# Patient Record
Sex: FEMALE | Race: ASIAN | NOT HISPANIC OR LATINO | URBAN - METROPOLITAN AREA
[De-identification: names, ages, dates, MRNs, and addresses within clinical notes are randomized per-mention and may not be internally consistent; named-entity substitution may affect disease eponyms.]

---

## 2017-01-27 ENCOUNTER — OUTPATIENT (OUTPATIENT)
Dept: OUTPATIENT SERVICES | Facility: HOSPITAL | Age: 62
LOS: 1 days | End: 2017-01-27
Payer: COMMERCIAL

## 2017-01-27 ENCOUNTER — APPOINTMENT (OUTPATIENT)
Dept: ULTRASOUND IMAGING | Facility: IMAGING CENTER | Age: 62
End: 2017-01-27

## 2017-01-27 ENCOUNTER — APPOINTMENT (OUTPATIENT)
Dept: MAMMOGRAPHY | Facility: IMAGING CENTER | Age: 62
End: 2017-01-27

## 2017-01-27 DIAGNOSIS — Z00.8 ENCOUNTER FOR OTHER GENERAL EXAMINATION: ICD-10-CM

## 2017-01-27 DIAGNOSIS — Z98.89 OTHER SPECIFIED POSTPROCEDURAL STATES: Chronic | ICD-10-CM

## 2017-01-27 PROCEDURE — G0279: CPT

## 2017-01-27 PROCEDURE — 77066 DX MAMMO INCL CAD BI: CPT

## 2017-01-27 PROCEDURE — 76642 ULTRASOUND BREAST LIMITED: CPT

## 2017-08-31 ENCOUNTER — RX RENEWAL (OUTPATIENT)
Age: 62
End: 2017-08-31

## 2017-08-31 ENCOUNTER — APPOINTMENT (OUTPATIENT)
Dept: ORTHOPEDIC SURGERY | Facility: CLINIC | Age: 62
End: 2017-08-31
Payer: COMMERCIAL

## 2017-08-31 VITALS — WEIGHT: 199 LBS | HEIGHT: 62 IN | BODY MASS INDEX: 36.62 KG/M2

## 2017-08-31 VITALS — HEART RATE: 72 BPM | DIASTOLIC BLOOD PRESSURE: 77 MMHG | SYSTOLIC BLOOD PRESSURE: 144 MMHG

## 2017-08-31 PROCEDURE — 73562 X-RAY EXAM OF KNEE 3: CPT | Mod: LT

## 2017-08-31 PROCEDURE — 99214 OFFICE O/P EST MOD 30 MIN: CPT

## 2017-09-07 ENCOUNTER — OTHER (OUTPATIENT)
Age: 62
End: 2017-09-07

## 2017-09-13 ENCOUNTER — OTHER (OUTPATIENT)
Age: 62
End: 2017-09-13

## 2017-09-20 ENCOUNTER — OTHER (OUTPATIENT)
Age: 62
End: 2017-09-20

## 2017-09-27 ENCOUNTER — OTHER (OUTPATIENT)
Age: 62
End: 2017-09-27

## 2018-02-01 ENCOUNTER — APPOINTMENT (OUTPATIENT)
Dept: ORTHOPEDIC SURGERY | Facility: CLINIC | Age: 63
End: 2018-02-01
Payer: COMMERCIAL

## 2018-02-01 VITALS
DIASTOLIC BLOOD PRESSURE: 81 MMHG | HEIGHT: 62 IN | BODY MASS INDEX: 35.7 KG/M2 | HEART RATE: 72 BPM | WEIGHT: 194 LBS | SYSTOLIC BLOOD PRESSURE: 151 MMHG

## 2018-02-01 PROCEDURE — 73562 X-RAY EXAM OF KNEE 3: CPT | Mod: RT

## 2018-02-01 PROCEDURE — 99214 OFFICE O/P EST MOD 30 MIN: CPT

## 2018-02-08 ENCOUNTER — OTHER (OUTPATIENT)
Age: 63
End: 2018-02-08

## 2018-02-15 ENCOUNTER — APPOINTMENT (OUTPATIENT)
Dept: ULTRASOUND IMAGING | Facility: IMAGING CENTER | Age: 63
End: 2018-02-15
Payer: COMMERCIAL

## 2018-02-15 ENCOUNTER — APPOINTMENT (OUTPATIENT)
Dept: MAMMOGRAPHY | Facility: IMAGING CENTER | Age: 63
End: 2018-02-15
Payer: COMMERCIAL

## 2018-02-15 ENCOUNTER — OUTPATIENT (OUTPATIENT)
Dept: OUTPATIENT SERVICES | Facility: HOSPITAL | Age: 63
LOS: 1 days | End: 2018-02-15
Payer: COMMERCIAL

## 2018-02-15 DIAGNOSIS — Z98.89 OTHER SPECIFIED POSTPROCEDURAL STATES: Chronic | ICD-10-CM

## 2018-02-15 DIAGNOSIS — Z00.8 ENCOUNTER FOR OTHER GENERAL EXAMINATION: ICD-10-CM

## 2018-02-15 PROCEDURE — 77063 BREAST TOMOSYNTHESIS BI: CPT | Mod: 26

## 2018-02-15 PROCEDURE — 76641 ULTRASOUND BREAST COMPLETE: CPT | Mod: 26,50

## 2018-02-15 PROCEDURE — 77067 SCR MAMMO BI INCL CAD: CPT | Mod: 26

## 2018-02-15 PROCEDURE — 76641 ULTRASOUND BREAST COMPLETE: CPT

## 2018-02-15 PROCEDURE — 77063 BREAST TOMOSYNTHESIS BI: CPT

## 2018-02-15 PROCEDURE — 77067 SCR MAMMO BI INCL CAD: CPT

## 2018-03-29 ENCOUNTER — APPOINTMENT (OUTPATIENT)
Dept: ORTHOPEDIC SURGERY | Facility: CLINIC | Age: 63
End: 2018-03-29
Payer: COMMERCIAL

## 2018-03-29 VITALS — DIASTOLIC BLOOD PRESSURE: 82 MMHG | SYSTOLIC BLOOD PRESSURE: 138 MMHG | HEART RATE: 69 BPM

## 2018-03-29 DIAGNOSIS — Z96.652 PRESENCE OF LEFT ARTIFICIAL KNEE JOINT: ICD-10-CM

## 2018-03-29 DIAGNOSIS — Z78.9 OTHER SPECIFIED HEALTH STATUS: ICD-10-CM

## 2018-03-29 PROCEDURE — 73562 X-RAY EXAM OF KNEE 3: CPT | Mod: LT

## 2018-03-29 PROCEDURE — 99214 OFFICE O/P EST MOD 30 MIN: CPT

## 2018-04-03 ENCOUNTER — OTHER (OUTPATIENT)
Age: 63
End: 2018-04-03

## 2018-07-24 ENCOUNTER — OUTPATIENT (OUTPATIENT)
Dept: OUTPATIENT SERVICES | Facility: HOSPITAL | Age: 63
LOS: 1 days | End: 2018-07-24
Payer: COMMERCIAL

## 2018-07-24 ENCOUNTER — APPOINTMENT (OUTPATIENT)
Dept: CT IMAGING | Facility: CLINIC | Age: 63
End: 2018-07-24
Payer: COMMERCIAL

## 2018-07-24 DIAGNOSIS — M17.11 UNILATERAL PRIMARY OSTEOARTHRITIS, RIGHT KNEE: ICD-10-CM

## 2018-07-24 DIAGNOSIS — Z98.89 OTHER SPECIFIED POSTPROCEDURAL STATES: Chronic | ICD-10-CM

## 2018-07-24 PROCEDURE — 73700 CT LOWER EXTREMITY W/O DYE: CPT | Mod: 26,RT

## 2018-07-24 PROCEDURE — 73700 CT LOWER EXTREMITY W/O DYE: CPT

## 2018-09-11 ENCOUNTER — OUTPATIENT (OUTPATIENT)
Dept: OUTPATIENT SERVICES | Facility: HOSPITAL | Age: 63
LOS: 1 days | End: 2018-09-11
Payer: COMMERCIAL

## 2018-09-11 VITALS
TEMPERATURE: 98 F | SYSTOLIC BLOOD PRESSURE: 130 MMHG | HEIGHT: 62 IN | HEART RATE: 60 BPM | WEIGHT: 195.99 LBS | DIASTOLIC BLOOD PRESSURE: 70 MMHG

## 2018-09-11 DIAGNOSIS — M17.11 UNILATERAL PRIMARY OSTEOARTHRITIS, RIGHT KNEE: ICD-10-CM

## 2018-09-11 DIAGNOSIS — I10 ESSENTIAL (PRIMARY) HYPERTENSION: ICD-10-CM

## 2018-09-11 DIAGNOSIS — T78.40XA ALLERGY, UNSPECIFIED, INITIAL ENCOUNTER: ICD-10-CM

## 2018-09-11 DIAGNOSIS — Z96.652 PRESENCE OF LEFT ARTIFICIAL KNEE JOINT: Chronic | ICD-10-CM

## 2018-09-11 DIAGNOSIS — K21.9 GASTRO-ESOPHAGEAL REFLUX DISEASE WITHOUT ESOPHAGITIS: ICD-10-CM

## 2018-09-11 DIAGNOSIS — M17.10 UNILATERAL PRIMARY OSTEOARTHRITIS, UNSPECIFIED KNEE: ICD-10-CM

## 2018-09-11 DIAGNOSIS — Z98.89 OTHER SPECIFIED POSTPROCEDURAL STATES: Chronic | ICD-10-CM

## 2018-09-11 LAB
APPEARANCE UR: CLEAR — SIGNIFICANT CHANGE UP
BACTERIA # UR AUTO: NEGATIVE — SIGNIFICANT CHANGE UP
BILIRUB UR-MCNC: NEGATIVE — SIGNIFICANT CHANGE UP
BLD GP AB SCN SERPL QL: NEGATIVE — SIGNIFICANT CHANGE UP
BLOOD UR QL VISUAL: NEGATIVE — SIGNIFICANT CHANGE UP
BUN SERPL-MCNC: 17 MG/DL — SIGNIFICANT CHANGE UP (ref 7–23)
CALCIUM SERPL-MCNC: 9.2 MG/DL — SIGNIFICANT CHANGE UP (ref 8.4–10.5)
CHLORIDE SERPL-SCNC: 105 MMOL/L — SIGNIFICANT CHANGE UP (ref 98–107)
CO2 SERPL-SCNC: 25 MMOL/L — SIGNIFICANT CHANGE UP (ref 22–31)
COLOR SPEC: YELLOW — SIGNIFICANT CHANGE UP
CREAT SERPL-MCNC: 0.66 MG/DL — SIGNIFICANT CHANGE UP (ref 0.5–1.3)
GLUCOSE SERPL-MCNC: 101 MG/DL — HIGH (ref 70–99)
GLUCOSE UR-MCNC: NEGATIVE — SIGNIFICANT CHANGE UP
HBA1C BLD-MCNC: 5.7 % — HIGH (ref 4–5.6)
HCT VFR BLD CALC: 40.3 % — SIGNIFICANT CHANGE UP (ref 34.5–45)
HGB BLD-MCNC: 13.4 G/DL — SIGNIFICANT CHANGE UP (ref 11.5–15.5)
HYALINE CASTS # UR AUTO: NEGATIVE — SIGNIFICANT CHANGE UP
KETONES UR-MCNC: NEGATIVE — SIGNIFICANT CHANGE UP
LEUKOCYTE ESTERASE UR-ACNC: NEGATIVE — SIGNIFICANT CHANGE UP
MCHC RBC-ENTMCNC: 30.2 PG — SIGNIFICANT CHANGE UP (ref 27–34)
MCHC RBC-ENTMCNC: 33.3 % — SIGNIFICANT CHANGE UP (ref 32–36)
MCV RBC AUTO: 90.8 FL — SIGNIFICANT CHANGE UP (ref 80–100)
NITRITE UR-MCNC: NEGATIVE — SIGNIFICANT CHANGE UP
NRBC # FLD: 0 — SIGNIFICANT CHANGE UP
PH UR: 5.5 — SIGNIFICANT CHANGE UP (ref 5–8)
PLATELET # BLD AUTO: 187 K/UL — SIGNIFICANT CHANGE UP (ref 150–400)
PMV BLD: 13.8 FL — HIGH (ref 7–13)
POTASSIUM SERPL-MCNC: 3.8 MMOL/L — SIGNIFICANT CHANGE UP (ref 3.5–5.3)
POTASSIUM SERPL-SCNC: 3.8 MMOL/L — SIGNIFICANT CHANGE UP (ref 3.5–5.3)
PROT UR-MCNC: 20 — SIGNIFICANT CHANGE UP
RBC # BLD: 4.44 M/UL — SIGNIFICANT CHANGE UP (ref 3.8–5.2)
RBC # FLD: 13.3 % — SIGNIFICANT CHANGE UP (ref 10.3–14.5)
RBC CASTS # UR COMP ASSIST: HIGH (ref 0–?)
RH IG SCN BLD-IMP: POSITIVE — SIGNIFICANT CHANGE UP
SODIUM SERPL-SCNC: 142 MMOL/L — SIGNIFICANT CHANGE UP (ref 135–145)
SP GR SPEC: 1.03 — SIGNIFICANT CHANGE UP (ref 1–1.04)
SQUAMOUS # UR AUTO: SIGNIFICANT CHANGE UP
UROBILINOGEN FLD QL: NORMAL — SIGNIFICANT CHANGE UP
WBC # BLD: 6.41 K/UL — SIGNIFICANT CHANGE UP (ref 3.8–10.5)
WBC # FLD AUTO: 6.41 K/UL — SIGNIFICANT CHANGE UP (ref 3.8–10.5)
WBC UR QL: SIGNIFICANT CHANGE UP (ref 0–?)

## 2018-09-11 PROCEDURE — 93010 ELECTROCARDIOGRAM REPORT: CPT

## 2018-09-11 RX ORDER — ACETAMINOPHEN 500 MG
2 TABLET ORAL
Qty: 0 | Refills: 0 | COMMUNITY

## 2018-09-11 NOTE — H&P PST ADULT - NSANTHOSAYNRD_GEN_A_CORE
No. FOREIGN screening performed.  STOP BANG Legend: 0-2 = LOW Risk; 3-4 = INTERMEDIATE Risk; 5-8 = HIGH Risk

## 2018-09-11 NOTE — H&P PST ADULT - PROBLEM SELECTOR PLAN 1
Right Total Knee Replacement     Pre op instructions reviewed with pt ; pt appears to have a good understanding of pre op instructions    Pt to Dr Urban for pre op m/c ; ekg faxed for comparison s/w Holli

## 2018-09-11 NOTE — H&P PST ADULT - PSH
H/O dilation and curettage  2 years ago  H/O total knee replacement, left  2015   (normal spontaneous vaginal delivery)  x3

## 2018-09-11 NOTE — H&P PST ADULT - FAMILY HISTORY
Mother  Still living? Yes, Estimated age: 71-80  Family history of hypertension, Age at diagnosis: Age Unknown

## 2018-09-11 NOTE — H&P PST ADULT - PROBLEM SELECTOR PLAN 2
ASA, Naproxen, tetanus Toxoid, " glue used for Left THR"     OR booking notified via fax ASA, Naproxen, tetanus Toxoid, " glue used for Left TKR"   regarding "glue allergy " pt to review with surgeon pre op    OR booking notified via fax

## 2018-09-11 NOTE — H&P PST ADULT - PMH
Abdominal pain  9/11/18 ; pt denies  GERD (gastroesophageal reflux disease)    HTN (hypertension)    Hypothyroidism  9/11/18 ; pt denies  Obesity (BMI 30-39.9)    Osteoarthrosis, localized, primary, involving lower leg

## 2018-09-11 NOTE — H&P PST ADULT - HISTORY OF PRESENT ILLNESS
Pt is a 63 y.o. female ; pt s/p Left TKR ; 6/15. Pt reports"  ALLERGIC REACTION TO GLUE " Pt c/o right knee pain; difficulty with mobility . Pt to surgeon ; xray and a c/t scan were done ; pt now presents for Right [place] Knee Replacement Pt is a 63 y.o. female ; pt s/p Left TKR ; 6/15. Pt reports"  ALLERGIC REACTION TO GLUE " Pt c/o right knee pain; difficulty with mobility . Pt to surgeon ; xray and a c/t scan were done ; pt now presents for Right Total  Knee Replacement

## 2018-09-12 LAB — SPECIMEN SOURCE: SIGNIFICANT CHANGE UP

## 2018-09-13 LAB
BACTERIA NPH CULT: SIGNIFICANT CHANGE UP
BACTERIA UR CULT: SIGNIFICANT CHANGE UP
SPECIMEN SOURCE: SIGNIFICANT CHANGE UP

## 2018-09-14 ENCOUNTER — OTHER (OUTPATIENT)
Age: 63
End: 2018-09-14

## 2018-09-19 ENCOUNTER — OTHER (OUTPATIENT)
Age: 63
End: 2018-09-19

## 2018-09-19 DIAGNOSIS — M17.11 UNILATERAL PRIMARY OSTEOARTHRITIS, RIGHT KNEE: ICD-10-CM

## 2018-09-21 PROBLEM — K21.9 GASTRO-ESOPHAGEAL REFLUX DISEASE WITHOUT ESOPHAGITIS: Chronic | Status: ACTIVE | Noted: 2018-09-11

## 2018-09-24 ENCOUNTER — TRANSCRIPTION ENCOUNTER (OUTPATIENT)
Age: 63
End: 2018-09-24

## 2018-09-24 NOTE — ASU PATIENT PROFILE, ADULT - CAREGIVER ADDRESS
[address] [place] [address] [facility] [id number] [address] [facility] [id number]

## 2018-09-25 ENCOUNTER — APPOINTMENT (OUTPATIENT)
Dept: ORTHOPEDIC SURGERY | Facility: HOSPITAL | Age: 63
End: 2018-09-25

## 2018-09-25 ENCOUNTER — RESULT REVIEW (OUTPATIENT)
Age: 63
End: 2018-09-25

## 2018-09-25 ENCOUNTER — INPATIENT (INPATIENT)
Facility: HOSPITAL | Age: 63
LOS: 1 days | Discharge: HOME HEALTH | End: 2018-09-27
Attending: ORTHOPAEDIC SURGERY | Admitting: ORTHOPAEDIC SURGERY
Payer: COMMERCIAL

## 2018-09-25 VITALS
RESPIRATION RATE: 17 BRPM | WEIGHT: 195.99 LBS | HEIGHT: 62 IN | TEMPERATURE: 98 F | DIASTOLIC BLOOD PRESSURE: 79 MMHG | SYSTOLIC BLOOD PRESSURE: 145 MMHG | OXYGEN SATURATION: 98 % | HEART RATE: 69 BPM

## 2018-09-25 DIAGNOSIS — M17.11 UNILATERAL PRIMARY OSTEOARTHRITIS, RIGHT KNEE: ICD-10-CM

## 2018-09-25 DIAGNOSIS — Z98.89 OTHER SPECIFIED POSTPROCEDURAL STATES: Chronic | ICD-10-CM

## 2018-09-25 DIAGNOSIS — Z96.652 PRESENCE OF LEFT ARTIFICIAL KNEE JOINT: Chronic | ICD-10-CM

## 2018-09-25 LAB
BUN SERPL-MCNC: 17 MG/DL — SIGNIFICANT CHANGE UP (ref 7–23)
CALCIUM SERPL-MCNC: 9.2 MG/DL — SIGNIFICANT CHANGE UP (ref 8.4–10.5)
CHLORIDE SERPL-SCNC: 98 MMOL/L — SIGNIFICANT CHANGE UP (ref 98–107)
CO2 SERPL-SCNC: 25 MMOL/L — SIGNIFICANT CHANGE UP (ref 22–31)
CREAT SERPL-MCNC: 0.67 MG/DL — SIGNIFICANT CHANGE UP (ref 0.5–1.3)
GLUCOSE SERPL-MCNC: 149 MG/DL — HIGH (ref 70–99)
HCT VFR BLD CALC: 42 % — SIGNIFICANT CHANGE UP (ref 34.5–45)
HGB BLD-MCNC: 14 G/DL — SIGNIFICANT CHANGE UP (ref 11.5–15.5)
MCHC RBC-ENTMCNC: 29.7 PG — SIGNIFICANT CHANGE UP (ref 27–34)
MCHC RBC-ENTMCNC: 33.3 % — SIGNIFICANT CHANGE UP (ref 32–36)
MCV RBC AUTO: 89 FL — SIGNIFICANT CHANGE UP (ref 80–100)
NRBC # FLD: 0 — SIGNIFICANT CHANGE UP
PLATELET # BLD AUTO: 165 K/UL — SIGNIFICANT CHANGE UP (ref 150–400)
PMV BLD: 13.8 FL — HIGH (ref 7–13)
POTASSIUM SERPL-MCNC: 4.2 MMOL/L — SIGNIFICANT CHANGE UP (ref 3.5–5.3)
POTASSIUM SERPL-SCNC: 4.2 MMOL/L — SIGNIFICANT CHANGE UP (ref 3.5–5.3)
RBC # BLD: 4.72 M/UL — SIGNIFICANT CHANGE UP (ref 3.8–5.2)
RBC # FLD: 13 % — SIGNIFICANT CHANGE UP (ref 10.3–14.5)
SODIUM SERPL-SCNC: 138 MMOL/L — SIGNIFICANT CHANGE UP (ref 135–145)
WBC # BLD: 8.28 K/UL — SIGNIFICANT CHANGE UP (ref 3.8–10.5)
WBC # FLD AUTO: 8.28 K/UL — SIGNIFICANT CHANGE UP (ref 3.8–10.5)

## 2018-09-25 PROCEDURE — 73560 X-RAY EXAM OF KNEE 1 OR 2: CPT | Mod: 26,RT

## 2018-09-25 PROCEDURE — 88305 TISSUE EXAM BY PATHOLOGIST: CPT | Mod: 26

## 2018-09-25 PROCEDURE — 88311 DECALCIFY TISSUE: CPT | Mod: 26

## 2018-09-25 PROCEDURE — 27447 TOTAL KNEE ARTHROPLASTY: CPT | Mod: RT

## 2018-09-25 RX ORDER — GABAPENTIN 400 MG/1
100 CAPSULE ORAL EVERY 8 HOURS
Qty: 0 | Refills: 0 | Status: DISCONTINUED | OUTPATIENT
Start: 2018-09-25 | End: 2018-09-25

## 2018-09-25 RX ORDER — ACETAMINOPHEN 500 MG
650 TABLET ORAL EVERY 6 HOURS
Qty: 0 | Refills: 0 | Status: DISCONTINUED | OUTPATIENT
Start: 2018-09-25 | End: 2018-09-27

## 2018-09-25 RX ORDER — SODIUM CHLORIDE 9 MG/ML
1000 INJECTION INTRAMUSCULAR; INTRAVENOUS; SUBCUTANEOUS ONCE
Qty: 0 | Refills: 0 | Status: COMPLETED | OUTPATIENT
Start: 2018-09-26 | End: 2018-09-26

## 2018-09-25 RX ORDER — SENNA PLUS 8.6 MG/1
2 TABLET ORAL AT BEDTIME
Qty: 0 | Refills: 0 | Status: DISCONTINUED | OUTPATIENT
Start: 2018-09-25 | End: 2018-09-27

## 2018-09-25 RX ORDER — POLYETHYLENE GLYCOL 3350 17 G/17G
17 POWDER, FOR SOLUTION ORAL DAILY
Qty: 0 | Refills: 0 | Status: DISCONTINUED | OUTPATIENT
Start: 2018-09-25 | End: 2018-09-27

## 2018-09-25 RX ORDER — OXYCODONE HYDROCHLORIDE 5 MG/1
10 TABLET ORAL EVERY 4 HOURS
Qty: 0 | Refills: 0 | Status: DISCONTINUED | OUTPATIENT
Start: 2018-09-25 | End: 2018-09-27

## 2018-09-25 RX ORDER — DOCUSATE SODIUM 100 MG
100 CAPSULE ORAL THREE TIMES A DAY
Qty: 0 | Refills: 0 | Status: DISCONTINUED | OUTPATIENT
Start: 2018-09-25 | End: 2018-09-27

## 2018-09-25 RX ORDER — METOCLOPRAMIDE HCL 10 MG
10 TABLET ORAL ONCE
Qty: 0 | Refills: 0 | Status: DISCONTINUED | OUTPATIENT
Start: 2018-09-25 | End: 2018-09-25

## 2018-09-25 RX ORDER — PANTOPRAZOLE SODIUM 20 MG/1
40 TABLET, DELAYED RELEASE ORAL DAILY
Qty: 0 | Refills: 0 | Status: DISCONTINUED | OUTPATIENT
Start: 2018-09-25 | End: 2018-09-27

## 2018-09-25 RX ORDER — CELECOXIB 200 MG/1
200 CAPSULE ORAL DAILY
Qty: 0 | Refills: 0 | Status: DISCONTINUED | OUTPATIENT
Start: 2018-09-25 | End: 2018-09-27

## 2018-09-25 RX ORDER — HYDROMORPHONE HYDROCHLORIDE 2 MG/ML
0.5 INJECTION INTRAMUSCULAR; INTRAVENOUS; SUBCUTANEOUS
Qty: 0 | Refills: 0 | Status: DISCONTINUED | OUTPATIENT
Start: 2018-09-25 | End: 2018-09-25

## 2018-09-25 RX ORDER — HYDROMORPHONE HYDROCHLORIDE 2 MG/ML
0.25 INJECTION INTRAMUSCULAR; INTRAVENOUS; SUBCUTANEOUS
Qty: 0 | Refills: 0 | Status: DISCONTINUED | OUTPATIENT
Start: 2018-09-25 | End: 2018-09-25

## 2018-09-25 RX ORDER — MORPHINE SULFATE 50 MG/1
2 CAPSULE, EXTENDED RELEASE ORAL
Qty: 0 | Refills: 0 | Status: DISCONTINUED | OUTPATIENT
Start: 2018-09-25 | End: 2018-09-27

## 2018-09-25 RX ORDER — LOSARTAN POTASSIUM 100 MG/1
50 TABLET, FILM COATED ORAL DAILY
Qty: 0 | Refills: 0 | Status: DISCONTINUED | OUTPATIENT
Start: 2018-09-25 | End: 2018-09-27

## 2018-09-25 RX ORDER — SODIUM CHLORIDE 9 MG/ML
1000 INJECTION, SOLUTION INTRAVENOUS
Qty: 0 | Refills: 0 | Status: DISCONTINUED | OUTPATIENT
Start: 2018-09-25 | End: 2018-09-26

## 2018-09-25 RX ORDER — PANTOPRAZOLE SODIUM 20 MG/1
40 TABLET, DELAYED RELEASE ORAL ONCE
Qty: 0 | Refills: 0 | Status: DISCONTINUED | OUTPATIENT
Start: 2018-09-25 | End: 2018-09-25

## 2018-09-25 RX ORDER — MORPHINE SULFATE 50 MG/1
4 CAPSULE, EXTENDED RELEASE ORAL ONCE
Qty: 0 | Refills: 0 | Status: DISCONTINUED | OUTPATIENT
Start: 2018-09-25 | End: 2018-09-27

## 2018-09-25 RX ORDER — SODIUM CHLORIDE 9 MG/ML
1000 INJECTION INTRAMUSCULAR; INTRAVENOUS; SUBCUTANEOUS ONCE
Qty: 0 | Refills: 0 | Status: COMPLETED | OUTPATIENT
Start: 2018-09-25 | End: 2018-09-25

## 2018-09-25 RX ORDER — ONDANSETRON 8 MG/1
4 TABLET, FILM COATED ORAL EVERY 6 HOURS
Qty: 0 | Refills: 0 | Status: DISCONTINUED | OUTPATIENT
Start: 2018-09-25 | End: 2018-09-27

## 2018-09-25 RX ORDER — OXYCODONE HYDROCHLORIDE 5 MG/1
5 TABLET ORAL EVERY 4 HOURS
Qty: 0 | Refills: 0 | Status: DISCONTINUED | OUTPATIENT
Start: 2018-09-25 | End: 2018-09-27

## 2018-09-25 RX ORDER — CEFAZOLIN SODIUM 1 G
2000 VIAL (EA) INJECTION EVERY 8 HOURS
Qty: 0 | Refills: 0 | Status: COMPLETED | OUTPATIENT
Start: 2018-09-25 | End: 2018-09-26

## 2018-09-25 RX ORDER — TRAMADOL HYDROCHLORIDE 50 MG/1
50 TABLET ORAL EVERY 8 HOURS
Qty: 0 | Refills: 0 | Status: DISCONTINUED | OUTPATIENT
Start: 2018-09-25 | End: 2018-09-27

## 2018-09-25 RX ORDER — INFLUENZA VIRUS VACCINE 15; 15; 15; 15 UG/.5ML; UG/.5ML; UG/.5ML; UG/.5ML
0.5 SUSPENSION INTRAMUSCULAR ONCE
Qty: 0 | Refills: 0 | Status: COMPLETED | OUTPATIENT
Start: 2018-09-25 | End: 2018-09-27

## 2018-09-25 RX ORDER — ACETAMINOPHEN 500 MG
975 TABLET ORAL ONCE
Qty: 0 | Refills: 0 | Status: COMPLETED | OUTPATIENT
Start: 2018-09-25 | End: 2018-09-25

## 2018-09-25 RX ORDER — SODIUM CHLORIDE 9 MG/ML
1000 INJECTION, SOLUTION INTRAVENOUS
Qty: 0 | Refills: 0 | Status: DISCONTINUED | OUTPATIENT
Start: 2018-09-25 | End: 2018-09-25

## 2018-09-25 RX ORDER — ASPIRIN/CALCIUM CARB/MAGNESIUM 324 MG
325 TABLET ORAL
Qty: 0 | Refills: 0 | Status: DISCONTINUED | OUTPATIENT
Start: 2018-09-25 | End: 2018-09-27

## 2018-09-25 RX ORDER — DEXAMETHASONE 0.5 MG/5ML
10 ELIXIR ORAL ONCE
Qty: 0 | Refills: 0 | Status: COMPLETED | OUTPATIENT
Start: 2018-09-26 | End: 2018-09-26

## 2018-09-25 RX ORDER — ESOMEPRAZOLE MAGNESIUM 40 MG/1
0 CAPSULE, DELAYED RELEASE ORAL
Qty: 0 | Refills: 0 | COMMUNITY

## 2018-09-25 RX ORDER — MAGNESIUM HYDROXIDE 400 MG/1
30 TABLET, CHEWABLE ORAL DAILY
Qty: 0 | Refills: 0 | Status: DISCONTINUED | OUTPATIENT
Start: 2018-09-25 | End: 2018-09-27

## 2018-09-25 RX ADMIN — OXYCODONE HYDROCHLORIDE 10 MILLIGRAM(S): 5 TABLET ORAL at 22:33

## 2018-09-25 RX ADMIN — SODIUM CHLORIDE 1000 MILLILITER(S): 9 INJECTION INTRAMUSCULAR; INTRAVENOUS; SUBCUTANEOUS at 16:39

## 2018-09-25 RX ADMIN — Medication 150 MILLIGRAM(S): at 09:12

## 2018-09-25 RX ADMIN — OXYCODONE HYDROCHLORIDE 10 MILLIGRAM(S): 5 TABLET ORAL at 23:30

## 2018-09-25 RX ADMIN — SODIUM CHLORIDE 30 MILLILITER(S): 9 INJECTION, SOLUTION INTRAVENOUS at 09:17

## 2018-09-25 RX ADMIN — Medication 100 MILLIGRAM(S): at 22:33

## 2018-09-25 RX ADMIN — OXYCODONE HYDROCHLORIDE 10 MILLIGRAM(S): 5 TABLET ORAL at 16:44

## 2018-09-25 RX ADMIN — TRAMADOL HYDROCHLORIDE 50 MILLIGRAM(S): 50 TABLET ORAL at 22:33

## 2018-09-25 RX ADMIN — HYDROMORPHONE HYDROCHLORIDE 0.5 MILLIGRAM(S): 2 INJECTION INTRAMUSCULAR; INTRAVENOUS; SUBCUTANEOUS at 15:09

## 2018-09-25 RX ADMIN — OXYCODONE HYDROCHLORIDE 10 MILLIGRAM(S): 5 TABLET ORAL at 17:40

## 2018-09-25 RX ADMIN — Medication 325 MILLIGRAM(S): at 18:21

## 2018-09-25 RX ADMIN — Medication 650 MILLIGRAM(S): at 18:21

## 2018-09-25 RX ADMIN — HYDROMORPHONE HYDROCHLORIDE 0.5 MILLIGRAM(S): 2 INJECTION INTRAMUSCULAR; INTRAVENOUS; SUBCUTANEOUS at 15:31

## 2018-09-25 RX ADMIN — TRAMADOL HYDROCHLORIDE 50 MILLIGRAM(S): 50 TABLET ORAL at 23:30

## 2018-09-25 RX ADMIN — Medication 150 MILLIGRAM(S): at 22:33

## 2018-09-25 RX ADMIN — Medication 100 MILLIGRAM(S): at 20:21

## 2018-09-25 RX ADMIN — SODIUM CHLORIDE 125 MILLILITER(S): 9 INJECTION, SOLUTION INTRAVENOUS at 15:33

## 2018-09-25 RX ADMIN — Medication 975 MILLIGRAM(S): at 09:12

## 2018-09-25 NOTE — PROGRESS NOTE ADULT - SUBJECTIVE AND OBJECTIVE BOX
Patient is seen and examined. Denies CP/SOB/DIzziness/N/V/D/HA. Pain is controlled.     Vital Signs Last 24 Hrs  T(C): 36.7 (25 Sep 2018 13:25), Max: 36.8 (25 Sep 2018 08:19)  T(F): 98.1 (25 Sep 2018 13:25), Max: 98.2 (25 Sep 2018 08:19)  HR: 60 (25 Sep 2018 15:00) (60 - 69)  BP: 123/57 (25 Sep 2018 15:00) (120/58 - 145/79)  BP(mean): --  RR: 20 (25 Sep 2018 15:00) (16 - 20)  SpO2: 100% (25 Sep 2018 15:00) (95% - 100%)    Gen: NAD    RLE: Dressing C/D/I. HV is in place.   Motor intact 5/5 RLE. Sensation is reduced throughout RLE throughout. Compartments are soft, extremities are warm. DP 1+ BL LE.    Labs:      09-25    138  |  98  |  17  ----------------------------<  149<H>  4.2   |  25  |  0.67    Ca    9.2      25 Sep 2018 13:51        A/P: Patient is a 63y Female s/p R total knee arthroplasty, POD # 0    - Pain control / Analgesia  -Antibiotic  -Monitor HV output  - Anticoagulation  -PT/OT  -WBAT  -OOB  -Inc Spirometry  -Foot Pumps  -F/U AM Labs  -Notify Ortho with any questions

## 2018-09-25 NOTE — PHYSICAL THERAPY INITIAL EVALUATION ADULT - ACTIVE RANGE OF MOTION EXAMINATION, REHAB EVAL
right hip and knee flex 0-100, ankle wfl/bilateral upper extremity Active ROM was WFL (within functional limits)/Left LE Active ROM was WFL (within functional limits)

## 2018-09-25 NOTE — PHYSICAL THERAPY INITIAL EVALUATION ADULT - ADDITIONAL COMMENTS
Patient report lives with wife in house with split level stairs, amb with out assistive device, owns a cane.

## 2018-09-25 NOTE — CONSULT NOTE ADULT - SUBJECTIVE AND OBJECTIVE BOX
Patient is a 63y old  Female who presents with a chief complaint of "I m having my right knee replaced" (11 Sep 2018 08:31)      HPI:  Pt is a 63 y.o. female ; pt s/p Left TKR ; 6/15. Pt reports"  ALLERGIC REACTION TO GLUE " Pt c/o right knee pain; difficulty with mobility . Pt to surgeon ; xray and a c/t scan were done ; pt now presents for Right Total  Knee Replacement (11 Sep 2018 08:31)      PAST MEDICAL & SURGICAL HISTORY:  Neck pain  GERD (gastroesophageal reflux disease)  Osteoarthrosis, localized, primary, involving lower leg  Obesity (BMI 30-39.9)  Abdominal pain: 18 ; pt denies  HTN (hypertension)  Hypothyroidism: 18 ; pt denies   (normal spontaneous vaginal delivery): x3  H/O total knee replacement, left: 2015  H/O dilation and curettage: 2 years ago      Review of Systems:   CONSTITUTIONAL: No fever,  or fatigue  NECK: No pain or stiffness  RESPIRATORY: No cough, wheezing, chills or hemoptysis; No shortness of breath  CARDIOVASCULAR: No chest pain, palpitations, dizziness, or leg swelling  GASTROINTESTINAL: No abdominal or epigastric pain. No nausea, vomiting, or hematemesis; No diarrhea or constipation.   NEUROLOGICAL: No headaches,           Allergies    aspirin (Unknown)  naproxen (Stomach Upset)  pt states told &quot; secondary to glue used on her knee when she had the Left Knee replaced&quot; (Rash)  tetanus immune globulin (Rash)    Intolerances        Social History:     FAMILY HISTORY:  Family history of hypertension (Mother)      MEDICATIONS  (STANDING):  acetaminophen   Tablet .. 650 milliGRAM(s) Oral every 6 hours  aspirin enteric coated 325 milliGRAM(s) Oral two times a day  ceFAZolin   IVPB 2000 milliGRAM(s) IV Intermittent every 8 hours  celecoxib 200 milliGRAM(s) Oral daily  docusate sodium 100 milliGRAM(s) Oral three times a day  influenza   Vaccine 0.5 milliLiter(s) IntraMuscular once  lactated ringers. 1000 milliLiter(s) (125 mL/Hr) IV Continuous <Continuous>  losartan 50 milliGRAM(s) Oral daily  pantoprazole    Tablet 40 milliGRAM(s) Oral daily  polyethylene glycol 3350 17 Gram(s) Oral daily  traMADol 50 milliGRAM(s) Oral every 8 hours    MEDICATIONS  (PRN):  aluminum hydroxide/magnesium hydroxide/simethicone Suspension 30 milliLiter(s) Oral four times a day PRN Indigestion  magnesium hydroxide Suspension 30 milliLiter(s) Oral daily PRN Constipation  morphine  - Injectable 2 milliGRAM(s) IV Push every 3 hours PRN Severe Pain (7 - 10)  morphine  - Injectable 4 milliGRAM(s) IV Push once PRN severe breakthrough pain  ondansetron Injectable 4 milliGRAM(s) IV Push every 6 hours PRN Nausea and/or Vomiting  oxyCODONE    IR 5 milliGRAM(s) Oral every 4 hours PRN Mild Pain (1 - 3)  oxyCODONE    IR 10 milliGRAM(s) Oral every 4 hours PRN Moderate Pain (4 - 6)  senna 2 Tablet(s) Oral at bedtime PRN Constipation      CAPILLARY BLOOD GLUCOSE        I&O's Summary    25 Sep 2018 07:  -  25 Sep 2018 22:41  --------------------------------------------------------  IN: 490 mL / OUT: 90 mL / NET: 400 mL        PHYSICAL EXAM:    HEAD:  Atraumatic, Normocephalic  NECK: Supple, No JVD  CHEST/LUNG: Clear to auscultation bilaterally; No wheezing.  HEART: Regular rate and rhythm; No murmurs, rubs, or gallops  ABDOMEN: Soft, Nontender, Nondistended; Bowel sounds present  EXTREMITIES:  2+ Peripheral Pulses, No clubbing, cyanosis, or edema  PSYCH: AAOx3  NEUROLOGY: non-focal      LABS:                        14.0   8.28  )-----------( 165      ( 25 Sep 2018 14:35 )             42.0     -    138  |  98  |  17  ----------------------------<  149<H>  4.2   |  25  |  0.67    Ca    9.2      25 Sep 2018 13:51              CAPILLARY BLOOD GLUCOSE                    RADIOLOGY & ADDITIONAL TESTS:    Imaging Personally Reviewed:    Consultant(s) Notes Reviewed:      Care Discussed with Consultants/Other Providers:    Thanks for consult. Will follow.

## 2018-09-25 NOTE — CONSULT NOTE ADULT - ASSESSMENT
Patient is a 63y old  Female who presents with a chief complaint of "I m having my right knee replaced" (11 Sep 2018 08:31).    S/p Rt TKR:    WBAT  Pain control - Adequate.  DVT prophylaxis  BID  Bowel regimen.  Ortho f/up noted.    HTN:    Losartan  BP stable

## 2018-09-25 NOTE — BRIEF OPERATIVE NOTE - PROCEDURE
<<-----Click on this checkbox to enter Procedure Right total knee replacement  [facility]25/2018  [facility]  Active  [id number]

## 2018-09-26 ENCOUNTER — TRANSCRIPTION ENCOUNTER (OUTPATIENT)
Age: 63
End: 2018-09-26

## 2018-09-26 DIAGNOSIS — K21.9 GASTRO-ESOPHAGEAL REFLUX DISEASE WITHOUT ESOPHAGITIS: ICD-10-CM

## 2018-09-26 DIAGNOSIS — M17.11 UNILATERAL PRIMARY OSTEOARTHRITIS, RIGHT KNEE: ICD-10-CM

## 2018-09-26 DIAGNOSIS — D50.0 IRON DEFICIENCY ANEMIA SECONDARY TO BLOOD LOSS (CHRONIC): ICD-10-CM

## 2018-09-26 DIAGNOSIS — I10 ESSENTIAL (PRIMARY) HYPERTENSION: ICD-10-CM

## 2018-09-26 LAB
BUN SERPL-MCNC: 17 MG/DL — SIGNIFICANT CHANGE UP (ref 7–23)
CALCIUM SERPL-MCNC: 8.2 MG/DL — LOW (ref 8.4–10.5)
CHLORIDE SERPL-SCNC: 102 MMOL/L — SIGNIFICANT CHANGE UP (ref 98–107)
CO2 SERPL-SCNC: 23 MMOL/L — SIGNIFICANT CHANGE UP (ref 22–31)
CREAT SERPL-MCNC: 0.59 MG/DL — SIGNIFICANT CHANGE UP (ref 0.5–1.3)
GLUCOSE SERPL-MCNC: 180 MG/DL — HIGH (ref 70–99)
HCT VFR BLD CALC: 32.3 % — LOW (ref 34.5–45)
HGB BLD-MCNC: 11 G/DL — LOW (ref 11.5–15.5)
MCHC RBC-ENTMCNC: 29.7 PG — SIGNIFICANT CHANGE UP (ref 27–34)
MCHC RBC-ENTMCNC: 34.1 % — SIGNIFICANT CHANGE UP (ref 32–36)
MCV RBC AUTO: 87.3 FL — SIGNIFICANT CHANGE UP (ref 80–100)
NRBC # FLD: 0 — SIGNIFICANT CHANGE UP
PLATELET # BLD AUTO: 152 K/UL — SIGNIFICANT CHANGE UP (ref 150–400)
PMV BLD: 14.2 FL — HIGH (ref 7–13)
POTASSIUM SERPL-MCNC: 3.6 MMOL/L — SIGNIFICANT CHANGE UP (ref 3.5–5.3)
POTASSIUM SERPL-SCNC: 3.6 MMOL/L — SIGNIFICANT CHANGE UP (ref 3.5–5.3)
RBC # BLD: 3.7 M/UL — LOW (ref 3.8–5.2)
RBC # FLD: 13.1 % — SIGNIFICANT CHANGE UP (ref 10.3–14.5)
SODIUM SERPL-SCNC: 138 MMOL/L — SIGNIFICANT CHANGE UP (ref 135–145)
WBC # BLD: 17.09 K/UL — HIGH (ref 3.8–10.5)
WBC # FLD AUTO: 17.09 K/UL — HIGH (ref 3.8–10.5)

## 2018-09-26 PROCEDURE — 99233 SBSQ HOSP IP/OBS HIGH 50: CPT

## 2018-09-26 RX ADMIN — Medication 650 MILLIGRAM(S): at 18:25

## 2018-09-26 RX ADMIN — OXYCODONE HYDROCHLORIDE 10 MILLIGRAM(S): 5 TABLET ORAL at 05:42

## 2018-09-26 RX ADMIN — Medication 325 MILLIGRAM(S): at 18:26

## 2018-09-26 RX ADMIN — TRAMADOL HYDROCHLORIDE 50 MILLIGRAM(S): 50 TABLET ORAL at 05:35

## 2018-09-26 RX ADMIN — OXYCODONE HYDROCHLORIDE 10 MILLIGRAM(S): 5 TABLET ORAL at 11:13

## 2018-09-26 RX ADMIN — Medication 100 MILLIGRAM(S): at 05:35

## 2018-09-26 RX ADMIN — Medication 75 MILLIGRAM(S): at 18:24

## 2018-09-26 RX ADMIN — Medication 100 MILLIGRAM(S): at 14:59

## 2018-09-26 RX ADMIN — OXYCODONE HYDROCHLORIDE 10 MILLIGRAM(S): 5 TABLET ORAL at 22:00

## 2018-09-26 RX ADMIN — CELECOXIB 200 MILLIGRAM(S): 200 CAPSULE ORAL at 11:13

## 2018-09-26 RX ADMIN — SODIUM CHLORIDE 1000 MILLILITER(S): 9 INJECTION INTRAMUSCULAR; INTRAVENOUS; SUBCUTANEOUS at 06:50

## 2018-09-26 RX ADMIN — Medication 650 MILLIGRAM(S): at 05:35

## 2018-09-26 RX ADMIN — OXYCODONE HYDROCHLORIDE 10 MILLIGRAM(S): 5 TABLET ORAL at 16:07

## 2018-09-26 RX ADMIN — TRAMADOL HYDROCHLORIDE 50 MILLIGRAM(S): 50 TABLET ORAL at 21:17

## 2018-09-26 RX ADMIN — Medication 100 MILLIGRAM(S): at 03:25

## 2018-09-26 RX ADMIN — POLYETHYLENE GLYCOL 3350 17 GRAM(S): 17 POWDER, FOR SOLUTION ORAL at 11:13

## 2018-09-26 RX ADMIN — Medication 102 MILLIGRAM(S): at 05:34

## 2018-09-26 RX ADMIN — Medication 650 MILLIGRAM(S): at 11:13

## 2018-09-26 RX ADMIN — TRAMADOL HYDROCHLORIDE 50 MILLIGRAM(S): 50 TABLET ORAL at 14:59

## 2018-09-26 RX ADMIN — Medication 325 MILLIGRAM(S): at 05:35

## 2018-09-26 RX ADMIN — LOSARTAN POTASSIUM 50 MILLIGRAM(S): 100 TABLET, FILM COATED ORAL at 05:35

## 2018-09-26 RX ADMIN — OXYCODONE HYDROCHLORIDE 10 MILLIGRAM(S): 5 TABLET ORAL at 16:50

## 2018-09-26 RX ADMIN — OXYCODONE HYDROCHLORIDE 10 MILLIGRAM(S): 5 TABLET ORAL at 06:30

## 2018-09-26 RX ADMIN — Medication 75 MILLIGRAM(S): at 05:35

## 2018-09-26 RX ADMIN — OXYCODONE HYDROCHLORIDE 10 MILLIGRAM(S): 5 TABLET ORAL at 21:17

## 2018-09-26 RX ADMIN — OXYCODONE HYDROCHLORIDE 10 MILLIGRAM(S): 5 TABLET ORAL at 12:00

## 2018-09-26 RX ADMIN — PANTOPRAZOLE SODIUM 40 MILLIGRAM(S): 20 TABLET, DELAYED RELEASE ORAL at 11:13

## 2018-09-26 RX ADMIN — Medication 100 MILLIGRAM(S): at 21:17

## 2018-09-26 NOTE — PROGRESS NOTE ADULT - SUBJECTIVE AND OBJECTIVE BOX
Ortho Progress Note  BEVERLEY CHURCHILL   MRN-[id number]    63yFemale is s/p elective R TKA POD#1 w/out any c/o.  Resting comfortably, NAD, ANO x 3    Vital Signs Last 24 Hrs  T(C): 36.4 (26 Sep 2018 05:39), Max: 36.8 (25 Sep 2018 08:19)  T(F): 97.5 (26 Sep 2018 05:39), Max: 98.2 (25 Sep 2018 08:19)  HR: 75 (26 Sep 2018 05:39) (57 - 75)  BP: 134/55 (26 Sep 2018 05:39) (109/49 - 145/79)  BP(mean): --  RR: 16 (26 Sep 2018 05:39) (15 - 20)  SpO2: 100% (26 Sep 2018 05:39) (94% - 100%)  aspirin (Unknown)  naproxen (Stomach Upset)  pt states told &quot; secondary to glue used on her knee when she had the Left Knee replaced&quot; (Rash)  tetanus immune globulin (Rash)      S/P R TKA   R knee wound postop dressing is C/D/I  HV inplace 150cc/last 12hr shift, 240cc/last 24hr shift  motor RLE EHL, TA, GS intact  patient able to SLR on RLE  sensation RLE intact to light touch                          14.0   8.28  )-----------( 165      ( 25 Sep 2018 14:35 )             42.0     09-25    138  |  98  |  17  ----------------------------<  149<H>  4.2   |  25  |  0.67    Ca    9.2      25 Sep 2018 13:51          A/P Ortho Stable s/p elective R TKA POD#1...HV to be removed POD#2  ck Labs this am  continue Aspirin for anticoagulation   continue Physical Therapy BID: WBAT, OOB for gait training  discharge planning POD#2 when pt is cleared by physical therapy for safe home discharge

## 2018-09-26 NOTE — DISCHARGE NOTE ADULT - NS AS DC FOLLOWUP STROKE INST
Influenza vaccination (VIS Pub Date: August 7, 2015)/Smoking Cessation/knee replacement, exercise worksheet, tramadol, percocet,

## 2018-09-26 NOTE — DISCHARGE NOTE ADULT - PLAN OF CARE
pain control-> pain med may cause drowsiness, refrain from activities require decision making; physical therapy to help resume activities of daily living Office appointment is already made (see card attached to discharge folder) so if you need to reschedule please call Dr. Michael's office [phone]  weight bearing as tolerated to Right leg

## 2018-09-26 NOTE — DISCHARGE NOTE ADULT - ADDITIONAL INSTRUCTIONS
post surgical care  62yo female is s/p elective Right total knee arthroplasty 9/25/2018 without any intraoperative complications.  Patient is doing well and stable for discharge.  Patient is tolerating physical therapy: weight bearing to Right leg, gait training, exercises as shown by Dr. Michael and Physical Therapy.  Keep wound dressing on as is until day of staple removal.  Staples/sutures to be removed in the office 14 days from date of surgery.  Compressive knee ace dressing to be removed by Home Care Visiting nurse on the next day after discharge from the hospital.  Patient is on Aspirin (MUST TAKE WITH FOOD) for anticoagulation.  Orthopaedic Surgeon Dr. Michael would like patient to call private MD/Internist for appointment postop to maintain continuity of care.  Follow up with Dr. Michael's office in 1-2 weeks.  Istop reviewed Reference #: [id number]

## 2018-09-26 NOTE — DISCHARGE NOTE ADULT - INSTRUCTIONS
resume same diet as prior to surgery You have a post op appointment with Dr. Michael on October 10, 2018 @ 9am in the [address] [place] [address] office. If you are unable to keep this appointment, please call the office to reschedule. Call MD if you develop a fever, or if there is redness, swelling, drainage or pain not relieved by pain medication. No heavy lifting, bending, or straining to move your bowels. Take over the counter stool softeners as needed to prevent constipation which may be caused by pain medication.

## 2018-09-26 NOTE — OCCUPATIONAL THERAPY INITIAL EVALUATION ADULT - PERTINENT HX OF CURRENT PROBLEM, REHAB EVAL
Pt is a 63 y.o. female ; pt s/p Left TKR ; 6/15. Pt reports"  ALLERGIC REACTION TO GLUE " Pt c/o right knee pain; difficulty with mobility . Pt to surgeon ; xray and a c/t scan were done ; pt now presents for Right Total  Knee Replacement

## 2018-09-26 NOTE — DISCHARGE NOTE ADULT - CARE PLAN
Principal Discharge DX:	Primary osteoarthritis of right knee  Goal:	pain control-> pain med may cause drowsiness, refrain from activities require decision making; physical therapy to help resume activities of daily living  Assessment and plan of treatment:	Office appointment is already made (see card attached to discharge folder) so if you need to reschedule please call Dr. Michael's office [phone]  weight bearing as tolerated to Right leg

## 2018-09-26 NOTE — DISCHARGE NOTE ADULT - CONDITIONS AT DISCHARGE
Pt. is afebrile and offers no complaints. In no acute distress. Right knee dressing: clean, dry and intact. Pt is ambulating with a walker, tolerating diet well, and voiding in adequate amounts.

## 2018-09-26 NOTE — DISCHARGE NOTE ADULT - MEDICATION SUMMARY - MEDICATIONS TO STOP TAKING
I will STOP taking the medications listed below when I get home from the hospital:    Advil 200 mg oral tablet  -- 1 tab(s) by mouth every 6 hours

## 2018-09-26 NOTE — DISCHARGE NOTE ADULT - PATIENT PORTAL LINK FT
You can access the [facility], offered by [facility], by registering with the following website: [email or web address]

## 2018-09-26 NOTE — DISCHARGE NOTE ADULT - CARE PROVIDER_API CALL
Be Michael), Orthopaedic Surgery  [address] [address]  Phone: [phone]  Fax: [phone]

## 2018-09-26 NOTE — PROGRESS NOTE ADULT - SUBJECTIVE AND OBJECTIVE BOX
CHIEF COMPLAINT: f/u     SUBJECTIVE / OVERNIGHT EVENTS: Patient seen and examined. No acute events overnight. Pain well controlled and patient without any complaints.    MEDICATIONS  (STANDING):  acetaminophen   Tablet .. 650 milliGRAM(s) Oral every 6 hours  aspirin enteric coated 325 milliGRAM(s) Oral two times a day  celecoxib 200 milliGRAM(s) Oral daily  docusate sodium 100 milliGRAM(s) Oral three times a day  influenza   Vaccine 0.5 milliLiter(s) IntraMuscular once  lactated ringers. 1000 milliLiter(s) (125 mL/Hr) IV Continuous <Continuous>  losartan 50 milliGRAM(s) Oral daily  pantoprazole    Tablet 40 milliGRAM(s) Oral daily  polyethylene glycol 3350 17 Gram(s) Oral daily  pregabalin 75 milliGRAM(s) Oral two times a day  traMADol 50 milliGRAM(s) Oral every 8 hours    MEDICATIONS  (PRN):  aluminum hydroxide/magnesium hydroxide/simethicone Suspension 30 milliLiter(s) Oral four times a day PRN Indigestion  magnesium hydroxide Suspension 30 milliLiter(s) Oral daily PRN Constipation  morphine  - Injectable 2 milliGRAM(s) IV Push every 3 hours PRN Severe Pain (7 - 10)  morphine  - Injectable 4 milliGRAM(s) IV Push once PRN severe breakthrough pain  ondansetron Injectable 4 milliGRAM(s) IV Push every 6 hours PRN Nausea and/or Vomiting  oxyCODONE    IR 5 milliGRAM(s) Oral every 4 hours PRN Mild Pain (1 - 3)  oxyCODONE    IR 10 milliGRAM(s) Oral every 4 hours PRN Moderate Pain (4 - 6)  senna 2 Tablet(s) Oral at bedtime PRN Constipation      VITALS:  T(F): 98 (09-26-18 @ 13:56), Max: 98 (09-25-18 @ 21:37)  HR: 67 (09-26-18 @ 13:56) (57 - 75)  BP: 126/55 (09-26-18 @ 13:56) (109/49 - 134/60)  RR: 17 (09-26-18 @ 13:56) (15 - 18)  SpO2: 100% (09-26-18 @ 13:56)  Wt(kg): --      CAPILLARY BLOOD GLUCOSE    PHYSICAL EXAM:  GENERAL: NAD, well-developed  HEAD:  Atraumatic, Normocephalic  EYES: EOMI, PERRLA, conjunctiva and sclera clear  NECK: Supple, No JVD  CHEST/LUNG: Clear to auscultation bilaterally; No wheeze  HEART: Regular rate and rhythm; No murmurs, rubs, or gallops  ABDOMEN: Soft, Nontender, Nondistended; Bowel sounds present  EXTREMITIES:  2+ Peripheral Pulses, No clubbing, cyanosis, or edema  PSYCH: AAOx3  NEUROLOGY: non-focal  SKIN: No rashes or lesions    LABS:              11.0                 138  | 23   | 17           17.09 >-----------< 152     ------------------------< 180                   32.3                 3.6  | 102  | 0.59                                         Ca 8.2   Mg x     Ph x                      RADIOLOGY & ADDITIONAL TESTS:  Imaging Personally Reviewed: [x] Yes    [ ] Consultant(s) Notes Reviewed:  [x] Care Discussed with Consultants/Other Providers: Orthopedic PA - discussed CHIEF COMPLAINT: f/u     SUBJECTIVE / OVERNIGHT EVENTS:   Patient seen and examined this morning. No acute events overnight. Pain well controlled with pain meds. Left knee pain only when doing exercising and bending knee. Patient tolerated PT. Patient without chest pain, SOB, n/v, or abdominal pain.       MEDICATIONS  (STANDING):  acetaminophen   Tablet .. 650 milliGRAM(s) Oral every 6 hours  aspirin enteric coated 325 milliGRAM(s) Oral two times a day  celecoxib 200 milliGRAM(s) Oral daily  docusate sodium 100 milliGRAM(s) Oral three times a day  influenza   Vaccine 0.5 milliLiter(s) IntraMuscular once  lactated ringers. 1000 milliLiter(s) (125 mL/Hr) IV Continuous <Continuous>  losartan 50 milliGRAM(s) Oral daily  pantoprazole    Tablet 40 milliGRAM(s) Oral daily  polyethylene glycol 3350 17 Gram(s) Oral daily  pregabalin 75 milliGRAM(s) Oral two times a day  traMADol 50 milliGRAM(s) Oral every 8 hours    MEDICATIONS  (PRN):  aluminum hydroxide/magnesium hydroxide/simethicone Suspension 30 milliLiter(s) Oral four times a day PRN Indigestion  magnesium hydroxide Suspension 30 milliLiter(s) Oral daily PRN Constipation  morphine  - Injectable 2 milliGRAM(s) IV Push every 3 hours PRN Severe Pain (7 - 10)  morphine  - Injectable 4 milliGRAM(s) IV Push once PRN severe breakthrough pain  ondansetron Injectable 4 milliGRAM(s) IV Push every 6 hours PRN Nausea and/or Vomiting  oxyCODONE    IR 5 milliGRAM(s) Oral every 4 hours PRN Mild Pain (1 - 3)  oxyCODONE    IR 10 milliGRAM(s) Oral every 4 hours PRN Moderate Pain (4 - 6)  senna 2 Tablet(s) Oral at bedtime PRN Constipation      VITALS:  T(F): 98 (09-26-18 @ 13:56), Max: 98 (09-25-18 @ 21:37)  HR: 67 (09-26-18 @ 13:56) (57 - 75)  BP: 126/55 (09-26-18 @ 13:56) (109/49 - 134/60)  RR: 17 (09-26-18 @ 13:56) (15 - 18)  SpO2: 100% (09-26-18 @ 13:56)        PHYSICAL EXAM:  GENERAL: Obese in NAD, well-developed  EYES: EOMI, normal conjunctiva and sclera clear  CHEST/LUNG: Clear to auscultation bilaterally; No wheeze  HEART: Regular rate and rhythm; No murmurs, rubs, or gallops  ABDOMEN: Soft, Nontender, Nondistended; Bowel sounds present  EXTREMITIES:  2+ Peripheral Pulses, No clubbing, cyanosis, or edema. Left knee swelling. Left knee wrapped in ACE bandage, hemovac drain in place in left knee with sanguinous fluid in tubing  PSYCH: AAOx3  NEUROLOGY: non-focal          LABS:              11.0                 138  | 23   | 17           17.09 >-----------< 152     ------------------------< 180                   32.3                 3.6  | 102  | 0.59                                         Ca 8.2   Mg x     Ph x                  [ ] Consultant(s) Notes Reviewed:  [x] Care Discussed with Consultants/Other Providers: Orthopedic PA - discussed

## 2018-09-26 NOTE — DISCHARGE NOTE ADULT - CARE PROVIDERS DIRECT ADDRESSES
[email or web address]

## 2018-09-26 NOTE — DISCHARGE NOTE ADULT - HOSPITAL COURSE
62yo female is s/p elective Right total knee arthroplasty 9/25/2018 without any intraoperative complications.  Patient is doing well and stable for discharge.  Patient is tolerating physical therapy: weight bearing to Right leg, gait training, exercises as shown by Dr. Michael and Physical Therapy.  Keep wound dressing on as is until day of staple removal.  Staples/sutures to be removed in the office 14 days from date of surgery.  Compressive knee ace dressing to be removed by Home Care Visiting nurse on the next day after discharge from the hospital.  Patient is on Aspirin (MUST TAKE WITH FOOD) for anticoagulation.  Orthopaedic Surgeon Dr. Michael would like patient to call private MD/Internist for appointment postop to maintain continuity of care.  Follow up with Dr. Michael's office in 1-2 weeks.    Istop reviewed Reference #: [id number]

## 2018-09-27 ENCOUNTER — INBOUND DOCUMENT (OUTPATIENT)
Age: 63
End: 2018-09-27

## 2018-09-27 VITALS
HEART RATE: 976 BPM | OXYGEN SATURATION: 98 % | DIASTOLIC BLOOD PRESSURE: 58 MMHG | SYSTOLIC BLOOD PRESSURE: 119 MMHG | TEMPERATURE: 98 F | RESPIRATION RATE: 18 BRPM

## 2018-09-27 DIAGNOSIS — D69.6 THROMBOCYTOPENIA, UNSPECIFIED: ICD-10-CM

## 2018-09-27 LAB
BUN SERPL-MCNC: 19 MG/DL — SIGNIFICANT CHANGE UP (ref 7–23)
CALCIUM SERPL-MCNC: 8.6 MG/DL — SIGNIFICANT CHANGE UP (ref 8.4–10.5)
CHLORIDE SERPL-SCNC: 101 MMOL/L — SIGNIFICANT CHANGE UP (ref 98–107)
CO2 SERPL-SCNC: 23 MMOL/L — SIGNIFICANT CHANGE UP (ref 22–31)
CREAT SERPL-MCNC: 0.67 MG/DL — SIGNIFICANT CHANGE UP (ref 0.5–1.3)
GLUCOSE SERPL-MCNC: 128 MG/DL — HIGH (ref 70–99)
HCT VFR BLD CALC: 32 % — LOW (ref 34.5–45)
HGB BLD-MCNC: 10.4 G/DL — LOW (ref 11.5–15.5)
MCHC RBC-ENTMCNC: 30 PG — SIGNIFICANT CHANGE UP (ref 27–34)
MCHC RBC-ENTMCNC: 32.5 % — SIGNIFICANT CHANGE UP (ref 32–36)
MCV RBC AUTO: 92.2 FL — SIGNIFICANT CHANGE UP (ref 80–100)
NRBC # FLD: 0 — SIGNIFICANT CHANGE UP
PLATELET # BLD AUTO: 133 K/UL — LOW (ref 150–400)
PMV BLD: 13.8 FL — HIGH (ref 7–13)
POTASSIUM SERPL-MCNC: 3.9 MMOL/L — SIGNIFICANT CHANGE UP (ref 3.5–5.3)
POTASSIUM SERPL-SCNC: 3.9 MMOL/L — SIGNIFICANT CHANGE UP (ref 3.5–5.3)
RBC # BLD: 3.47 M/UL — LOW (ref 3.8–5.2)
RBC # FLD: 13.5 % — SIGNIFICANT CHANGE UP (ref 10.3–14.5)
SODIUM SERPL-SCNC: 137 MMOL/L — SIGNIFICANT CHANGE UP (ref 135–145)
WBC # BLD: 14.45 K/UL — HIGH (ref 3.8–10.5)
WBC # FLD AUTO: 14.45 K/UL — HIGH (ref 3.8–10.5)

## 2018-09-27 PROCEDURE — 99238 HOSP IP/OBS DSCHRG MGMT 30/<: CPT

## 2018-09-27 PROCEDURE — 99233 SBSQ HOSP IP/OBS HIGH 50: CPT

## 2018-09-27 RX ORDER — GABAPENTIN 400 MG/1
1 CAPSULE ORAL
Qty: 45 | Refills: 0
Start: 2018-09-27 | End: 2018-10-11

## 2018-09-27 RX ORDER — MELOXICAM 15 MG/1
1 TABLET ORAL
Qty: 30 | Refills: 0
Start: 2018-09-27 | End: 2018-10-26

## 2018-09-27 RX ORDER — TRAMADOL HYDROCHLORIDE 50 MG/1
1 TABLET ORAL
Qty: 21 | Refills: 0
Start: 2018-09-27 | End: 2018-10-03

## 2018-09-27 RX ORDER — SENNA PLUS 8.6 MG/1
2 TABLET ORAL
Qty: 0 | Refills: 0 | DISCHARGE
Start: 2018-09-27

## 2018-09-27 RX ORDER — IBUPROFEN 200 MG
1 TABLET ORAL
Qty: 0 | Refills: 0 | COMMUNITY

## 2018-09-27 RX ORDER — ASPIRIN/CALCIUM CARB/MAGNESIUM 324 MG
1 TABLET ORAL
Qty: 84 | Refills: 0
Start: 2018-09-27 | End: 2018-11-07

## 2018-09-27 RX ORDER — DOCUSATE SODIUM 100 MG
1 CAPSULE ORAL
Qty: 0 | Refills: 0 | DISCHARGE
Start: 2018-09-27

## 2018-09-27 RX ADMIN — CELECOXIB 200 MILLIGRAM(S): 200 CAPSULE ORAL at 12:27

## 2018-09-27 RX ADMIN — OXYCODONE HYDROCHLORIDE 10 MILLIGRAM(S): 5 TABLET ORAL at 06:35

## 2018-09-27 RX ADMIN — Medication 325 MILLIGRAM(S): at 06:35

## 2018-09-27 RX ADMIN — TRAMADOL HYDROCHLORIDE 50 MILLIGRAM(S): 50 TABLET ORAL at 07:35

## 2018-09-27 RX ADMIN — Medication 100 MILLIGRAM(S): at 06:35

## 2018-09-27 RX ADMIN — Medication 650 MILLIGRAM(S): at 12:27

## 2018-09-27 RX ADMIN — Medication 650 MILLIGRAM(S): at 06:35

## 2018-09-27 RX ADMIN — INFLUENZA VIRUS VACCINE 0.5 MILLILITER(S): 15; 15; 15; 15 SUSPENSION INTRAMUSCULAR at 07:54

## 2018-09-27 RX ADMIN — PANTOPRAZOLE SODIUM 40 MILLIGRAM(S): 20 TABLET, DELAYED RELEASE ORAL at 12:27

## 2018-09-27 RX ADMIN — Medication 75 MILLIGRAM(S): at 06:35

## 2018-09-27 RX ADMIN — LOSARTAN POTASSIUM 50 MILLIGRAM(S): 100 TABLET, FILM COATED ORAL at 06:35

## 2018-09-27 RX ADMIN — OXYCODONE HYDROCHLORIDE 10 MILLIGRAM(S): 5 TABLET ORAL at 03:15

## 2018-09-27 RX ADMIN — OXYCODONE HYDROCHLORIDE 10 MILLIGRAM(S): 5 TABLET ORAL at 02:23

## 2018-09-27 RX ADMIN — OXYCODONE HYDROCHLORIDE 10 MILLIGRAM(S): 5 TABLET ORAL at 12:19

## 2018-09-27 RX ADMIN — TRAMADOL HYDROCHLORIDE 50 MILLIGRAM(S): 50 TABLET ORAL at 06:35

## 2018-09-27 NOTE — PROGRESS NOTE ADULT - ASSESSMENT
Patient is a 63y old  Female who presents with a chief complaint of "I m having my right knee replaced" (11 Sep 2018 08:31).    S/p Rt TKR:    WBAT  Pain control - Adequate.  DVT prophylaxis  BID  Bowel regimen.  Ortho f/up noted.    HTN:    Losartan  BP stable    Leukocytosis:  Reactive.  no need for abx.
Mrs. Patel is a 64 yo woman with hx of HTN and OA admitted for elective left total knee arthoplasty, now POD#1. Doing well.
Mrs. Patel is a 64 yo woman with hx of HTN and OA admitted for elective left total knee arthoplasty, now POD#2. Doing well.

## 2018-09-27 NOTE — PROGRESS NOTE ADULT - PROBLEM SELECTOR PLAN 5
Mild. Unclear etiology. Pt not on heparin. If continues to downtrend, will need additional work-up  - f/u platelet count    DISPO: No medical contraindications to patient being discharged home today.

## 2018-09-27 NOTE — PROGRESS NOTE ADULT - PROBLEM SELECTOR PLAN 1
- care and pain control as per ortho  - PT  - incentive spirometry  - bowel regimen daily while on opioids
- care and pain control as per ortho  - PT  - incentive spirometry  - bowel regimen daily while on opioids

## 2018-09-27 NOTE — PROGRESS NOTE ADULT - PROBLEM SELECTOR PLAN 4
Asymptomatic. Expected drop in HB/Hct from intraoperative bleedings. Pt hemodynamically stable. No signs of ongoing significant bleeding  - monitor blood ct and surgical site
Asymptomatic. Expected drop in HB/Hct from intraoperative bleedings. Pt hemodynamically stable. No signs of ongoing significant bleeding  - monitor blood ct and surgical site

## 2018-09-27 NOTE — PROGRESS NOTE ADULT - SUBJECTIVE AND OBJECTIVE BOX
CHIEF COMPLAINT: f/u     SUBJECTIVE / OVERNIGHT EVENTS:   Patient seen and examined this morning after she completed PT. No acute events overnight. She is complaining of pain in left knee but is controlled with oral pain meds. No chest pain, SOB, n/v or abdominal pain. Hemovac drain removed this morning.       MEDICATIONS  (STANDING):  acetaminophen   Tablet 650 milliGRAM(s) Oral every 6 hours  aspirin enteric coated 325 milliGRAM(s) Oral two times a day  celecoxib 200 milliGRAM(s) Oral daily  docusate sodium 100 milliGRAM(s) Oral three times a day  losartan 50 milliGRAM(s) Oral daily  pantoprazole    Tablet 40 milliGRAM(s) Oral daily  polyethylene glycol 3350 17 Gram(s) Oral daily  pregabalin 75 milliGRAM(s) Oral two times a day  traMADol 50 milliGRAM(s) Oral every 8 hours    MEDICATIONS  (PRN):  aluminum hydroxide/magnesium hydroxide/simethicone Suspension 30 milliLiter(s) Oral four times a day PRN Indigestion  bisacodyl Suppository 10 milliGRAM(s) Rectal daily PRN If no bowel movement by POD#2  magnesium hydroxide Suspension 30 milliLiter(s) Oral daily PRN Constipation  morphine  - Injectable 2 milliGRAM(s) IV Push every 3 hours PRN Severe Pain (7 - 10)  morphine  - Injectable 4 milliGRAM(s) IV Push once PRN severe breakthrough pain  ondansetron Injectable 4 milliGRAM(s) IV Push every 6 hours PRN Nausea and/or Vomiting  oxyCODONE    IR 5 milliGRAM(s) Oral every 4 hours PRN Mild Pain (1 - 3)  oxyCODONE    IR 10 milliGRAM(s) Oral every 4 hours PRN Moderate Pain (4 - 6)  senna 2 Tablet(s) Oral at bedtime PRN Constipation      VITALS:  T(F): 97.6 (09-27-18 @ 11:34), Max: 98.2 (09-26-18 @ 17:06)  HR: 976 (09-27-18 @ 11:34) (57 - 976)  BP: 119/58 (09-27-18 @ 11:34) (119/58 - 129/54)  RR: 18 (09-27-18 @ 11:34) (15 - 18)  SpO2: 98% (09-27-18 @ 11:34)        PHYSICAL EXAM:  GENERAL: Obese in NAD, well-developed  EYES: EOMI, normal conjunctiva and sclera clear  CHEST/LUNG: Clear to auscultation bilaterally; No wheeze  HEART: Regular rate and rhythm; No murmurs, rubs, or gallops  ABDOMEN: Soft, Nontender, Nondistended; Bowel sounds present  EXTREMITIES:  2+ Peripheral Pulses, No clubbing, cyanosis, or edema. Left knee wrapped in ACE bandage  PSYCH: AAOx3, pleasant  NEUROLOGY: non-focal      LABS:              10.4                 137  | 23   | 19           14.45 >-----------< 133     ------------------------< 128                   32.0                 3.9  | 101  | 0.67                                         Ca 8.6   Mg x     Ph x              [ ] Consultant(s) Notes Reviewed:  [x] Care Discussed with Consultants/Other Providers: Orthopedic PA - discussed

## 2018-09-27 NOTE — PROGRESS NOTE ADULT - SUBJECTIVE AND OBJECTIVE BOX
Ortho Progress Note  BEVERLEY CHURCHILL   MRN-[id number]    63yFemale is s/p elective R TKA POD#2 w/out any c/o.  Resting comfortably, NAD, ANO x 3    Vital Signs Last 24 Hrs  T(C): 36.7 (27 Sep 2018 06:13), Max: 36.8 (26 Sep 2018 17:06)  T(F): 98 (27 Sep 2018 06:13), Max: 98.2 (26 Sep 2018 17:06)  HR: 66 (27 Sep 2018 06:13) (57 - 67)  BP: 128/52 (27 Sep 2018 06:13) (125/57 - 129/54)  BP(mean): --  RR: 15 (27 Sep 2018 06:13) (15 - 18)  SpO2: 98% (27 Sep 2018 06:13) (98% - 100%)  aspirin (Unknown)  naproxen (Stomach Upset)  pt states told &quot; secondary to glue used on her knee when she had the Left Knee replaced&quot; (Rash)  tetanus immune globulin (Rash)      S/P R TKA   R knee wound postop outer dressing is C/D/I - removed  R knee wound mepilex dressing is C/D/I and no skin reactions noted from mepilex  HV 30cc/last 12hr shift  HV removed intact and compressive dressing per Dr. Michael's protocol placed  patient tolerated procedure well  motor BLE EHL, TA, GS intact  sensation BLE intact to light touch                          11.0   17.09 )-----------( 152      ( 26 Sep 2018 06:30 )             32.3     09-26    138  |  102  |  17  ----------------------------<  180<H>  3.6   |  23  |  0.59    Ca    8.2<L>      26 Sep 2018 06:30          A/P Ortho Stable s/p elective R TKA POD#2  continue Aspirin for anticoagulation   continue Physical Therapy BID: WBAT, OOB for gait training  discharge planning today when pt is cleared by physical therapy for safe home discharge

## 2018-09-27 NOTE — PROGRESS NOTE ADULT - PROBLEM SELECTOR PLAN 2
- continue home regimen of losartan 50mg daily  - holding HCTZ 12.5mg daily for now  - monitor BP
BP well controlled  - continue home regimen of losartan 50mg daily  - holding HCTZ 12.5mg daily for now. Can likely resume upon discharge   - monitor BP

## 2018-10-02 LAB — SURGICAL PATHOLOGY STUDY: SIGNIFICANT CHANGE UP

## 2018-10-09 ENCOUNTER — RX RENEWAL (OUTPATIENT)
Age: 63
End: 2018-10-09

## 2018-10-11 ENCOUNTER — APPOINTMENT (OUTPATIENT)
Dept: ORTHOPEDIC SURGERY | Facility: CLINIC | Age: 63
End: 2018-10-11
Payer: COMMERCIAL

## 2018-10-11 PROBLEM — M54.2 CERVICALGIA: Chronic | Status: ACTIVE | Noted: 2018-09-25

## 2018-10-11 PROCEDURE — 99024 POSTOP FOLLOW-UP VISIT: CPT

## 2018-10-11 PROCEDURE — 73562 X-RAY EXAM OF KNEE 3: CPT | Mod: RT

## 2018-11-15 ENCOUNTER — APPOINTMENT (OUTPATIENT)
Dept: ORTHOPEDIC SURGERY | Facility: CLINIC | Age: 63
End: 2018-11-15
Payer: COMMERCIAL

## 2018-11-15 VITALS — HEART RATE: 73 BPM | SYSTOLIC BLOOD PRESSURE: 164 MMHG | DIASTOLIC BLOOD PRESSURE: 84 MMHG

## 2018-11-15 DIAGNOSIS — Z96.651 PRESENCE OF RIGHT ARTIFICIAL KNEE JOINT: ICD-10-CM

## 2018-11-15 PROCEDURE — 99024 POSTOP FOLLOW-UP VISIT: CPT

## 2018-12-24 ENCOUNTER — FORM ENCOUNTER (OUTPATIENT)
Age: 63
End: 2018-12-24

## 2020-01-27 NOTE — PATIENT PROFILE ADULT. - PRESSURE ULCER(S)
Fill Date ID   Written Drug Qty Days Prescriber Rx # Pharmacy Refill   Daily Dose* Pymt Type      12/24/2019  1   12/24/2019  Lorazepam 1 MG Tablet  30 00 30 Ma Alc   [id number]   Thr (6929)   0   Medicare PA   11/26/2019  1   11/26/2019  Lorazepam 1 MG Tablet  30 00 30 Ma Alc   [id number]   Thr (1621)   0   Medicare PA   09/02/2019  1   05/31/2019  Lorazepam 1 MG Tablet  90 00 90 Ma Alc   [id number]   Thr (8447)   1   Medicare PA   06/01/2019  1   05/31/2019  Lorazepam 1 MG Tablet  90 00 90 Ma Alc   [id number]   Thr (8447)   0   Medicare PA   03/02/2019  1   12/03/2018  Lorazepam 1 MG Tablet  90 00 90 Ma Alc   [id number]   Thr (8447)   1   Medicare PA   12/03/2018  1   12/03/2018  Lorazepam 1 MG Tablet  90 00 90 Ma Alc   [id number]   Thr (8447)   0   Medicare PA   09/05/2018  1   06/01/2018  Lorazepam 1 MG Tablet  90 00 90 Ma Alc   [id number]   Thr (8447)   1   Medicare PA   06/07/2018  1   06/01/2018  Lorazepam 1 MG Tablet  90 00 90 Ma Alc   [id number]   Thr (8447)   0   Medicare no

## 2020-07-19 ENCOUNTER — TRANSCRIPTION ENCOUNTER (OUTPATIENT)
Age: 65
End: 2020-07-19

## 2020-08-13 NOTE — DISCHARGE NOTE ADULT - MEDICATION SUMMARY - MEDICATIONS TO TAKE
Male I will START or STAY ON the medications listed below when I get home from the hospital:    magnesium  -- 1 tab(s) irrigation in the morning and at bedtime  -- Indication: For Home med    Mobic 15 mg oral tablet  -- 1 tab(s) by mouth once a day take with food  -- Do not take this drug if you are pregnant.  Obtain medical advice before taking any non-prescription drugs as some may affect the action of this medication.  Take with food or milk.    -- Indication: For Anti inflammatory    Percocet 5/325 oral tablet  -- 1-2 tab(s) by mouth every 6 hours as needed for moderate to severe pain  begin tapering off as pain decreases  MDD:EIGHT TABS  -- Caution federal law prohibits the transfer of this drug to any person other  than the person for whom it was prescribed.  May cause drowsiness.  Alcohol may intensify this effect.  Use care when operating dangerous machinery.  This prescription cannot be refilled.  This product contains acetaminophen.  Do not use  with any other product containing acetaminophen to prevent possible liver damage.  Using more of this medication than prescribed may cause serious breathing problems.    -- Indication: For Pain control    traMADol 50 mg oral tablet  -- 1 tab(s) by mouth every 8 hours take regularly (baseline control of mild pain)  begin tapering off as pain decreases  MDD:THREE TABS  -- Indication: For Pain control    aspirin 325 mg oral delayed release tablet  -- 1 tab(s) by mouth 2 times a day (with meals)   -- Indication: For blood thinner for postop clot prevention    gabapentin 100 mg oral capsule  -- 1 cap(s) by mouth 3 times a day   begin tapering off as pain decreases  MDD:THREE CAPS  -- It is very important that you take or use this exactly as directed.  Do not skip doses or discontinue unless directed by your doctor.  May cause drowsiness.  Alcohol may intensify this effect.  Use care when operating dangerous machinery.    -- Indication: For Pain control    losartan-hydrochlorothiazide 50mg-12.5mg oral tablet  -- 1 tab(s) by mouth once a day  -- Indication: For Home med    senna oral tablet  -- 2 tab(s) by mouth once a day (at bedtime)  over the counter for constipation caused by pain meds   -- Indication: For bowel stimulant    docusate sodium 100 mg oral capsule  -- 1 cap(s) by mouth 3 times a day  over the counter for constipation caused by pain meds   -- Indication: For stool softener    potassium chloride 20 mEq oral granule, extended release  -- orally once a day  -- Indication: For Home med    Protonix 40 mg oral delayed release tablet  -- 1 tab(s) by mouth once a day  -- Indication: For Home med MUST TAKE WHILE ON BLOOD THINNER    Vitamin D3 5000 intl units oral capsule  -- 1 cap(s) by mouth once a day  -- Indication: For Home med

## 2020-08-19 ENCOUNTER — APPOINTMENT (OUTPATIENT)
Dept: GASTROENTEROLOGY | Facility: CLINIC | Age: 65
End: 2020-08-19

## 2020-08-26 ENCOUNTER — APPOINTMENT (OUTPATIENT)
Dept: RHEUMATOLOGY | Facility: CLINIC | Age: 65
End: 2020-08-26
Payer: COMMERCIAL

## 2020-08-26 ENCOUNTER — LABORATORY RESULT (OUTPATIENT)
Age: 65
End: 2020-08-26

## 2020-08-26 VITALS
SYSTOLIC BLOOD PRESSURE: 153 MMHG | TEMPERATURE: 97.5 F | RESPIRATION RATE: 16 BRPM | BODY MASS INDEX: 38.09 KG/M2 | OXYGEN SATURATION: 98 % | DIASTOLIC BLOOD PRESSURE: 86 MMHG | HEART RATE: 78 BPM | WEIGHT: 207 LBS | HEIGHT: 62 IN

## 2020-08-26 DIAGNOSIS — M25.512 PAIN IN RIGHT SHOULDER: ICD-10-CM

## 2020-08-26 DIAGNOSIS — R52 PAIN, UNSPECIFIED: ICD-10-CM

## 2020-08-26 DIAGNOSIS — M54.2 CERVICALGIA: ICD-10-CM

## 2020-08-26 DIAGNOSIS — G89.29 CERVICALGIA: ICD-10-CM

## 2020-08-26 DIAGNOSIS — M13.0 POLYARTHRITIS, UNSPECIFIED: ICD-10-CM

## 2020-08-26 DIAGNOSIS — M25.511 PAIN IN RIGHT SHOULDER: ICD-10-CM

## 2020-08-26 PROCEDURE — 99204 OFFICE O/P NEW MOD 45 MIN: CPT

## 2020-08-26 RX ORDER — MELOXICAM 15 MG/1
15 TABLET ORAL DAILY
Qty: 30 | Refills: 1 | Status: DISCONTINUED | COMMUNITY
Start: 2018-11-15 | End: 2020-08-26

## 2020-08-26 RX ORDER — CAMPHOR 0.45 %
25 GEL (GRAM) TOPICAL
Qty: 60 | Refills: 0 | Status: DISCONTINUED | COMMUNITY
Start: 2018-10-09 | End: 2020-08-26

## 2020-08-26 RX ORDER — TRAMADOL HYDROCHLORIDE 50 MG/1
50 TABLET, COATED ORAL 3 TIMES DAILY
Qty: 90 | Refills: 0 | Status: DISCONTINUED | COMMUNITY
Start: 2018-10-11 | End: 2020-08-26

## 2020-08-26 RX ORDER — PANTOPRAZOLE 40 MG/1
40 TABLET, DELAYED RELEASE ORAL
Qty: 45 | Refills: 0 | Status: DISCONTINUED | COMMUNITY
Start: 2018-02-01 | End: 2020-08-26

## 2020-08-26 RX ORDER — HYALURONATE SODIUM 10 MG/ML
20 VIAL (ML) INTRAARTICULAR
Qty: 3 | Refills: 0 | Status: DISCONTINUED | OUTPATIENT
Start: 2017-08-31 | End: 2020-08-26

## 2020-08-26 RX ORDER — HYDROCORTISONE 25 MG/G
2.5 CREAM TOPICAL
Qty: 1 | Refills: 0 | Status: DISCONTINUED | COMMUNITY
Start: 2018-10-11 | End: 2020-08-26

## 2020-08-26 RX ORDER — MUPIROCIN 20 MG/G
2 OINTMENT TOPICAL
Qty: 1 | Refills: 0 | Status: DISCONTINUED | COMMUNITY
Start: 2018-09-14 | End: 2020-08-26

## 2020-08-26 RX ORDER — DICLOFENAC SODIUM 50 MG/1
50 TABLET, DELAYED RELEASE ORAL
Qty: 60 | Refills: 1 | Status: DISCONTINUED | COMMUNITY
Start: 2017-08-31 | End: 2020-08-26

## 2020-08-26 RX ORDER — NABUMETONE 500 MG/1
500 TABLET, FILM COATED ORAL
Qty: 60 | Refills: 1 | Status: DISCONTINUED | COMMUNITY
Start: 2018-02-01 | End: 2020-08-26

## 2020-08-26 RX ORDER — MELOXICAM 15 MG/1
15 TABLET ORAL
Qty: 30 | Refills: 1 | Status: DISCONTINUED | COMMUNITY
Start: 2018-10-24 | End: 2020-08-26

## 2020-08-26 RX ORDER — PANTOPRAZOLE 40 MG/1
40 TABLET, DELAYED RELEASE ORAL
Qty: 45 | Refills: 0 | Status: DISCONTINUED | COMMUNITY
Start: 2017-08-31 | End: 2020-08-26

## 2020-08-26 RX ORDER — CHROMIUM 200 MCG
TABLET ORAL
Refills: 0 | Status: ACTIVE | COMMUNITY

## 2020-08-27 LAB
ALBUMIN SERPL ELPH-MCNC: 4.4 G/DL
ALDOLASE SERPL-CCNC: 6 U/L
ALP BLD-CCNC: 68 U/L
ALT SERPL-CCNC: 23 U/L
ANION GAP SERPL CALC-SCNC: 14 MMOL/L
APPEARANCE: CLEAR
AST SERPL-CCNC: 19 U/L
BASOPHILS # BLD AUTO: 0.05 K/UL
BASOPHILS NFR BLD AUTO: 0.5 %
BILIRUB SERPL-MCNC: 0.3 MG/DL
BILIRUBIN URINE: NEGATIVE
BLOOD URINE: NEGATIVE
BUN SERPL-MCNC: 21 MG/DL
CALCIUM SERPL-MCNC: 9.2 MG/DL
CHLORIDE SERPL-SCNC: 100 MMOL/L
CK SERPL-CCNC: 38 U/L
CO2 SERPL-SCNC: 25 MMOL/L
COLOR: YELLOW
CREAT SERPL-MCNC: 0.69 MG/DL
CRP SERPL-MCNC: 0.21 MG/DL
EOSINOPHIL # BLD AUTO: 0.13 K/UL
EOSINOPHIL NFR BLD AUTO: 1.4 %
ERYTHROCYTE [SEDIMENTATION RATE] IN BLOOD BY WESTERGREN METHOD: 28 MM/HR
GLUCOSE QUALITATIVE U: NEGATIVE
GLUCOSE SERPL-MCNC: 84 MG/DL
HCT VFR BLD CALC: 45.9 %
HGB BLD-MCNC: 14.6 G/DL
IMM GRANULOCYTES NFR BLD AUTO: 0.4 %
KETONES URINE: NEGATIVE
LEUKOCYTE ESTERASE URINE: ABNORMAL
LYMPHOCYTES # BLD AUTO: 3.18 K/UL
LYMPHOCYTES NFR BLD AUTO: 34.5 %
MAN DIFF?: NORMAL
MCHC RBC-ENTMCNC: 30 PG
MCHC RBC-ENTMCNC: 31.8 GM/DL
MCV RBC AUTO: 94.4 FL
MONOCYTES # BLD AUTO: 0.61 K/UL
MONOCYTES NFR BLD AUTO: 6.6 %
MYOGLOBIN SERPL-MCNC: 21 NG/ML
NEUTROPHILS # BLD AUTO: 5.2 K/UL
NEUTROPHILS NFR BLD AUTO: 56.6 %
NITRITE URINE: NEGATIVE
PH URINE: 6.5
PLATELET # BLD AUTO: 210 K/UL
POTASSIUM SERPL-SCNC: 4.1 MMOL/L
PROT SERPL-MCNC: 6.8 G/DL
PROTEIN URINE: NORMAL
RBC # BLD: 4.86 M/UL
RBC # FLD: 14.2 %
RHEUMATOID FACT SER QL: <10 IU/ML
SODIUM SERPL-SCNC: 139 MMOL/L
SPECIFIC GRAVITY URINE: 1.02
UROBILINOGEN URINE: NORMAL
WBC # FLD AUTO: 9.21 K/UL

## 2020-08-28 LAB
CCP AB SER IA-ACNC: <8 UNITS
RF+CCP IGG SER-IMP: NEGATIVE

## 2020-08-31 ENCOUNTER — OUTPATIENT (OUTPATIENT)
Dept: OUTPATIENT SERVICES | Facility: HOSPITAL | Age: 65
LOS: 1 days | End: 2020-08-31
Payer: COMMERCIAL

## 2020-08-31 ENCOUNTER — APPOINTMENT (OUTPATIENT)
Dept: RADIOLOGY | Facility: IMAGING CENTER | Age: 65
End: 2020-08-31
Payer: COMMERCIAL

## 2020-08-31 DIAGNOSIS — Z00.8 ENCOUNTER FOR OTHER GENERAL EXAMINATION: ICD-10-CM

## 2020-08-31 DIAGNOSIS — M25.511 PAIN IN RIGHT SHOULDER: ICD-10-CM

## 2020-08-31 DIAGNOSIS — Z98.89 OTHER SPECIFIED POSTPROCEDURAL STATES: Chronic | ICD-10-CM

## 2020-08-31 DIAGNOSIS — Z96.652 PRESENCE OF LEFT ARTIFICIAL KNEE JOINT: Chronic | ICD-10-CM

## 2020-08-31 DIAGNOSIS — M13.0 POLYARTHRITIS, UNSPECIFIED: ICD-10-CM

## 2020-08-31 DIAGNOSIS — M54.2 CERVICALGIA: ICD-10-CM

## 2020-08-31 PROCEDURE — 73130 X-RAY EXAM OF HAND: CPT | Mod: 26,50

## 2020-08-31 PROCEDURE — 73130 X-RAY EXAM OF HAND: CPT

## 2020-08-31 PROCEDURE — 73030 X-RAY EXAM OF SHOULDER: CPT

## 2020-08-31 PROCEDURE — 72040 X-RAY EXAM NECK SPINE 2-3 VW: CPT | Mod: 26

## 2020-08-31 PROCEDURE — 72040 X-RAY EXAM NECK SPINE 2-3 VW: CPT

## 2020-08-31 PROCEDURE — 73030 X-RAY EXAM OF SHOULDER: CPT | Mod: 26,50

## 2020-09-03 ENCOUNTER — TRANSCRIPTION ENCOUNTER (OUTPATIENT)
Age: 65
End: 2020-09-03

## 2020-09-09 ENCOUNTER — APPOINTMENT (OUTPATIENT)
Dept: RHEUMATOLOGY | Facility: CLINIC | Age: 65
End: 2020-09-09
Payer: COMMERCIAL

## 2020-09-09 VITALS
BODY MASS INDEX: 38.64 KG/M2 | HEIGHT: 62 IN | WEIGHT: 210 LBS | TEMPERATURE: 97.3 F | DIASTOLIC BLOOD PRESSURE: 80 MMHG | HEART RATE: 73 BPM | SYSTOLIC BLOOD PRESSURE: 146 MMHG | OXYGEN SATURATION: 99 %

## 2020-09-09 DIAGNOSIS — M54.12 RADICULOPATHY, CERVICAL REGION: ICD-10-CM

## 2020-09-09 DIAGNOSIS — R22.9 LOCALIZED SWELLING, MASS AND LUMP, UNSPECIFIED: ICD-10-CM

## 2020-09-09 DIAGNOSIS — K21.9 GASTRO-ESOPHAGEAL REFLUX DISEASE W/OUT ESOPHAGITIS: ICD-10-CM

## 2020-09-09 PROCEDURE — 99213 OFFICE O/P EST LOW 20 MIN: CPT

## 2020-09-09 RX ORDER — PREDNISONE 20 MG/1
20 TABLET ORAL
Qty: 30 | Refills: 0 | Status: DISCONTINUED | COMMUNITY
Start: 2020-08-26 | End: 2020-09-09

## 2020-09-24 ENCOUNTER — OUTPATIENT (OUTPATIENT)
Dept: OUTPATIENT SERVICES | Facility: HOSPITAL | Age: 65
LOS: 1 days | End: 2020-09-24
Payer: COMMERCIAL

## 2020-09-24 ENCOUNTER — APPOINTMENT (OUTPATIENT)
Dept: MRI IMAGING | Facility: IMAGING CENTER | Age: 65
End: 2020-09-24
Payer: COMMERCIAL

## 2020-09-24 DIAGNOSIS — Z98.89 OTHER SPECIFIED POSTPROCEDURAL STATES: Chronic | ICD-10-CM

## 2020-09-24 DIAGNOSIS — Z00.8 ENCOUNTER FOR OTHER GENERAL EXAMINATION: ICD-10-CM

## 2020-09-24 DIAGNOSIS — Z96.652 PRESENCE OF LEFT ARTIFICIAL KNEE JOINT: Chronic | ICD-10-CM

## 2020-09-24 PROCEDURE — 72141 MRI NECK SPINE W/O DYE: CPT | Mod: 26

## 2020-09-24 PROCEDURE — 72141 MRI NECK SPINE W/O DYE: CPT

## 2020-10-27 ENCOUNTER — TRANSCRIPTION ENCOUNTER (OUTPATIENT)
Age: 65
End: 2020-10-27

## 2020-11-09 ENCOUNTER — LABORATORY RESULT (OUTPATIENT)
Age: 65
End: 2020-11-09

## 2020-11-09 ENCOUNTER — APPOINTMENT (OUTPATIENT)
Dept: DERMATOLOGY | Facility: CLINIC | Age: 65
End: 2020-11-09
Payer: COMMERCIAL

## 2020-11-09 VITALS — HEIGHT: 63 IN | WEIGHT: 200 LBS | BODY MASS INDEX: 35.44 KG/M2

## 2020-11-09 VITALS — TEMPERATURE: 97.6 F

## 2020-11-09 DIAGNOSIS — Z86.03 PERSONAL HISTORY OF NEOPLASM OF UNCERTAIN BEHAVIOR: ICD-10-CM

## 2020-11-09 DIAGNOSIS — Z91.89 OTHER SPECIFIED PERSONAL RISK FACTORS, NOT ELSEWHERE CLASSIFIED: ICD-10-CM

## 2020-11-09 DIAGNOSIS — D48.5 NEOPLASM OF UNCERTAIN BEHAVIOR OF SKIN: ICD-10-CM

## 2020-11-09 DIAGNOSIS — Z96.60 PRESENCE OF UNSPECIFIED ORTHOPEDIC JOINT IMPLANT: ICD-10-CM

## 2020-11-09 DIAGNOSIS — D23.30 OTHER BENIGN NEOPLASM OF SKIN OF UNSPECIFIED PART OF FACE: ICD-10-CM

## 2020-11-09 DIAGNOSIS — Z87.39 PERSONAL HISTORY OF OTHER DISEASES OF THE MUSCULOSKELETAL SYSTEM AND CONNECTIVE TISSUE: ICD-10-CM

## 2020-11-09 DIAGNOSIS — R23.8 OTHER SKIN CHANGES: ICD-10-CM

## 2020-11-09 PROCEDURE — 99202 OFFICE O/P NEW SF 15 MIN: CPT | Mod: 25

## 2020-11-09 PROCEDURE — 99072 ADDL SUPL MATRL&STAF TM PHE: CPT

## 2020-11-09 PROCEDURE — 11102 TANGNTL BX SKIN SINGLE LES: CPT

## 2020-11-11 ENCOUNTER — NON-APPOINTMENT (OUTPATIENT)
Age: 65
End: 2020-11-11

## 2020-11-13 ENCOUNTER — NON-APPOINTMENT (OUTPATIENT)
Age: 65
End: 2020-11-13

## 2021-05-24 ENCOUNTER — APPOINTMENT (OUTPATIENT)
Dept: ORTHOPEDIC SURGERY | Facility: CLINIC | Age: 66
End: 2021-05-24
Payer: COMMERCIAL

## 2021-05-24 VITALS
SYSTOLIC BLOOD PRESSURE: 146 MMHG | HEART RATE: 73 BPM | DIASTOLIC BLOOD PRESSURE: 80 MMHG | WEIGHT: 200 LBS | HEIGHT: 63 IN | BODY MASS INDEX: 35.44 KG/M2

## 2021-05-24 DIAGNOSIS — M54.12 RADICULOPATHY, CERVICAL REGION: ICD-10-CM

## 2021-05-24 DIAGNOSIS — M48.02 SPINAL STENOSIS, CERVICAL REGION: ICD-10-CM

## 2021-05-24 PROCEDURE — 99214 OFFICE O/P EST MOD 30 MIN: CPT

## 2021-05-26 PROBLEM — M54.12 CERVICAL RADICULOPATHY: Status: ACTIVE | Noted: 2021-05-26

## 2021-05-26 PROBLEM — M48.02 CERVICAL STENOSIS OF SPINE: Status: ACTIVE | Noted: 2021-05-26

## 2021-05-26 NOTE — PHYSICAL EXAM
[Ataxic] : not ataxic [de-identified] : Examination of the cervical spine reveals no midline or paraspinal tenderness to palpation. No cervical lymphadenopathy. Decreased range of motion with respect to flexion, extension, rotation, and lateral bending. Negative Spurlings. Negative Lhermitte's. Full range of motion bilateral shoulders without evidence of impingement. No instability of bilateral upper extremities.  Cranial nerves II through XII grossly intact. Intact sensation bilateral upper extremities. 5/5 deltoids biceps triceps wrist extensors wrist flexors finger flexors and hand intrinsics. 1+ biceps triceps and brachioradialis reflexes. Negative Barnes's. 2+ radial pulse. Negative Tinel's over the cubital and carpal tunnel. No skin lesions on the right and left upper extremities. [de-identified] : Cervical MRI demonstrates multilevel cervical stenosis and preserved alignment.  No high-grade spinal cord compression or spinal cord signal abnormality

## 2021-05-26 NOTE — REASON FOR VISIT
[Initial Visit] : an initial visit for [FreeTextEntry2] : Neck pain with bilateral upper extremity radiculopathy

## 2021-05-26 NOTE — DISCUSSION/SUMMARY
[de-identified] : We discussed further treatment options.  She would like to try some physical therapy and be evaluated for possible epidural injections.  Referrals were given.  Follow-up afterwards or sooner with any changes or worsening of her symptoms.

## 2021-05-26 NOTE — HISTORY OF PRESENT ILLNESS
[de-identified] : Patient is a 66 year-old female who presents to the office with neck pain with radicular symptoms that go down to the tips of her fingers. The symptoms have been present for many years. She has gone to doctors and taken medicine, but this has not been effective. 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 She has more pain than weakness in both of her arms. Overhead activities exacerbate the pain. She also complains of thenar muscle atrophy as well. Currently she takes Advil which gives some mild relief. She has tried meloxicam in the past which was much more effective, but she has severe gastric irritation from that.

## 2021-06-07 ENCOUNTER — APPOINTMENT (OUTPATIENT)
Dept: GASTROENTEROLOGY | Facility: CLINIC | Age: 66
End: 2021-06-07

## 2021-11-01 ENCOUNTER — APPOINTMENT (OUTPATIENT)
Dept: GASTROENTEROLOGY | Facility: CLINIC | Age: 66
End: 2021-11-01
Payer: COMMERCIAL

## 2021-11-01 VITALS
DIASTOLIC BLOOD PRESSURE: 68 MMHG | TEMPERATURE: 97.8 F | HEART RATE: 71 BPM | BODY MASS INDEX: 37.92 KG/M2 | SYSTOLIC BLOOD PRESSURE: 160 MMHG | OXYGEN SATURATION: 98 % | HEIGHT: 63 IN | WEIGHT: 214 LBS

## 2021-11-01 DIAGNOSIS — Z86.010 PERSONAL HISTORY OF COLONIC POLYPS: ICD-10-CM

## 2021-11-01 DIAGNOSIS — K29.70 GASTRITIS, UNSPECIFIED, W/OUT BLEEDING: ICD-10-CM

## 2021-11-01 DIAGNOSIS — B96.81 GASTRITIS, UNSPECIFIED, W/OUT BLEEDING: ICD-10-CM

## 2021-11-01 DIAGNOSIS — R13.12 DYSPHAGIA, OROPHARYNGEAL PHASE: ICD-10-CM

## 2021-11-01 DIAGNOSIS — D12.6 BENIGN NEOPLASM OF COLON, UNSPECIFIED: ICD-10-CM

## 2021-11-01 PROCEDURE — 99204 OFFICE O/P NEW MOD 45 MIN: CPT

## 2021-11-01 RX ORDER — MELOXICAM 7.5 MG/1
7.5 TABLET ORAL TWICE DAILY
Qty: 60 | Refills: 1 | Status: DISCONTINUED | COMMUNITY
Start: 2020-09-09 | End: 2021-11-01

## 2021-11-01 RX ORDER — PANTOPRAZOLE 40 MG/1
40 TABLET, DELAYED RELEASE ORAL
Qty: 90 | Refills: 3 | Status: DISCONTINUED | COMMUNITY
Start: 2020-09-09 | End: 2021-11-01

## 2021-11-01 RX ORDER — METHOCARBAMOL 500 MG/1
500 TABLET, FILM COATED ORAL
Qty: 30 | Refills: 1 | Status: DISCONTINUED | COMMUNITY
Start: 2020-09-09 | End: 2021-11-01

## 2021-11-01 RX ORDER — SODIUM SULFATE, POTASSIUM SULFATE, MAGNESIUM SULFATE 17.5; 3.13; 1.6 G/ML; G/ML; G/ML
17.5-3.13-1.6 SOLUTION, CONCENTRATE ORAL
Qty: 1 | Refills: 0 | Status: ACTIVE | COMMUNITY
Start: 2021-11-01 | End: 1900-01-01

## 2021-11-01 RX ORDER — MAGNESIUM OXIDE/MAG AA CHELATE 300 MG
CAPSULE ORAL
Refills: 0 | Status: DISCONTINUED | COMMUNITY
End: 2021-11-01

## 2021-11-01 NOTE — REASON FOR VISIT
[Initial Evaluation] : an initial evaluation [FreeTextEntry1] : dysphagia, hx of h pylori gastritis, hx of adenomatous colon polyp

## 2021-11-01 NOTE — HISTORY OF PRESENT ILLNESS
[FreeTextEntry1] : This is a pleasant 66-year-old female with history hypertension presenting with symptoms of "feeling like there is food sticking on the top of her throat hours after eating".  She reports occasional heartburn symptoms for which she takes over-the-counter antacids.  She denies abdominal pain, nausea or vomiting.  She had last seen me in 2014 with symptoms of epigastric burning.  She had positive stool H. pylori antigen and was treated with triple therapy.  She believes she took the medications.  A follow-up upper endoscopy in September 2014 showing normal esophagus and gastritis negative for H. pylori.  Colonoscopy with a small tubular adenoma.

## 2021-11-01 NOTE — ASSESSMENT
[FreeTextEntry1] : This is a 66-year-old female feeling as if there is food sitting in her throat hours after eating.  I recommend modified barium swallow with speech pathology to evaluate for dysphagia and to rule out a Zenker's diverticulum.  She has a history of H. pylori gastritis and an adenomatous colon polyp found by colonoscopy over 5 years ago.  I recommend an upper endoscopy and a surveillance colonoscopy.  I explained to her the risks, alternatives and benefits of both procedures.  Risk including but not limited to bleeding, perforation, infection and adverse medication reaction.  Questions were answered.  She stated understanding.

## 2021-11-29 ENCOUNTER — NON-APPOINTMENT (OUTPATIENT)
Age: 66
End: 2021-11-29

## 2021-11-30 ENCOUNTER — OUTPATIENT (OUTPATIENT)
Dept: OUTPATIENT SERVICES | Facility: HOSPITAL | Age: 66
LOS: 1 days | End: 2021-11-30

## 2021-11-30 ENCOUNTER — OUTPATIENT (OUTPATIENT)
Dept: OUTPATIENT SERVICES | Facility: HOSPITAL | Age: 66
LOS: 1 days | Discharge: HOME | End: 2021-11-30

## 2021-11-30 ENCOUNTER — APPOINTMENT (OUTPATIENT)
Dept: RADIOLOGY | Facility: HOSPITAL | Age: 66
End: 2021-11-30
Payer: COMMERCIAL

## 2021-11-30 ENCOUNTER — APPOINTMENT (OUTPATIENT)
Dept: SPEECH THERAPY | Facility: HOSPITAL | Age: 66
End: 2021-11-30
Payer: COMMERCIAL

## 2021-11-30 DIAGNOSIS — Z96.652 PRESENCE OF LEFT ARTIFICIAL KNEE JOINT: Chronic | ICD-10-CM

## 2021-11-30 DIAGNOSIS — R13.12 DYSPHAGIA, OROPHARYNGEAL PHASE: ICD-10-CM

## 2021-11-30 DIAGNOSIS — Z98.89 OTHER SPECIFIED POSTPROCEDURAL STATES: Chronic | ICD-10-CM

## 2021-11-30 PROCEDURE — 74230 X-RAY XM SWLNG FUNCJ C+: CPT | Mod: 26

## 2021-11-30 NOTE — PLAN
[FreeTextEntry2] : \par 1.) Continue with current diet regimen of Regular with Thin Liquids\par 2.) Aspiration Precautions\par 3.) Reflux Precautions\par 4.) Maintain Good Oral Hygiene Care\par 5.) Follow up with Physician\par 6.) Consider ENT Consultation given Patient offers c/o always feel like something is there" at MD's discretion. \par SLP provided Patient education regarding preliminary results. Patient verbalized understanding and will follow up with Physician. \par

## 2021-11-30 NOTE — HISTORY OF PRESENT ILLNESS
[FreeTextEntry1] : Patient arrived to Radiology for an OutPatient Modified Barium Swallow Study. Patient is a 65 y/o female with PMH as per EMR:\par \par Active Problems\par Abdominal distention (787.3) (R14.0)\par Abdominal pain (789.00) (R10.9)\par Abnormal finding on radiology exam (793.99) (R93.89)\par Adenomatous colon polyp (211.3) (D12.6)\par Bilateral shoulder pain (719.41) (M25.511,M25.512)\par Bladder pain (788.99) (R39.89)\par Cervical radicular pain (723.4) (M54.12)\par Cervical radiculopathy (723.4) (M54.12)\par Cervical stenosis of spine (723.0) (M48.02)\par Chronic GERD (530.81) (K21.9)\par Chronic neck pain (723.1,338.29) (M54.2,G89.29)\par Dermal nevus of face (216.3) (D23.30)\par Dysphagia, oropharyngeal (787.22) (R13.12)\par Helicobacter pylori gastritis (535.10,041.86) (K29.70,B96.81)\par History of adenomatous polyp of colon (V12.72) (Z86.010)\par Neoplasm of uncertain behavior of skin (238.2) (D48.5)\par Observation for suspected neoplasm (V71.1) (Z03.89)\par Pain (780.96) (R52)\par Papule of skin (709.8) (R23.8)\par PMB (postmenopausal bleeding) (627.1) (N95.0)\par Polyarthritis (716.50) (M13.0)\par Postoperative pain (338.18) (G89.18)\par Primary osteoarthritis of left knee (715.16) (M17.12)\par Primary osteoarthritis of right knee (715.16) (M17.11)\par Skin mass (782.2) (R22.9)\par Status post left knee replacement (V43.65) (Z96.652)\par Status post total right knee replacement (V43.65) (Z96.651)\par Umbilical hernia (553.1) (K42.9)\par Urge incontinence of urine (788.31) (N39.41)\par Vulvar itching (698.1) (L29.2)\par \par Past Medical History\par History of Chronic constipation (564.00) (K59.09)\par History of Early satiety (780.94) (R68.81)\par History of arthritis (V13.4) (Z87.39)\par History of artificial joint (V43.60) (Z96.60)\par History of candidiasis of vagina (V12.09) (Z86.19)\par History of hypertension (V12.59) (Z86.79)\par History of neoplasm of uncertain behavior of skin (V13.3) (Z86.03)\par History of urinary tract infection (V13.02) (Z87.440)\par Personal history of Helicobacter infection (V12.09) (Z86.19)\par \par Surgical History\par History of Endometrial Biopsy By Hysteroscopy\par History of Knee Replacement\par \par GI Note 11/1/2021 66-year-old female feeling as if there is food sitting in her throat hours after eating. I recommend modified barium swallow with speech pathology to evaluate for dysphagia and to rule out a Zenker's diverticulum. She has a history of H. pylori gastritis and an adenomatous colon polyp found by colonoscopy over 5 years ago. I recommend an upper endoscopy and a surveillance colonoscopy. I explained to her the risks, alternatives and benefits of both procedures. Risk including but not limited to bleeding, perforation, infection and adverse medication reaction. Questions were answered. She stated understanding. \par \par \par Patient offers c/o "food stuck in the throat, always feels something is there; its uncomfortable, no pain" x 2 months. Patient reports unremarkable neurological history. Patient eats Regular with Thin Liquids. Patient reports no current/repeated pneumonia.  This Modified Barium Swallow Study is to objectively assess the Physiology of the Oral and Pharyngeal Stage swallowing mechanism for treatment plan for least restrictive diet. \par \par Referring MD: Dr. Yaquelin Quiroz\par Fax: [phone]

## 2021-11-30 NOTE — ASSESSMENT
[FreeTextEntry1] : Patient presents with Functional Oral and Pharyngeal Stage swallowing mechanism. The Oral Stage is characterized by adequate oral containment, adequate chewing for solid, adequate bolus manipulation, adequate tongue motion with adequate anterior to posterior transfer of the bolus for puree/solids; with adequate oral clearance post swallow.  The Pharyngeal Stage is characterized by adequate initiation of the pharyngeal swallow, adequate laryngeal elevation, adequate tongue base retraction and adequate pharyngeal constriction. There is adequate pharyngeal clearance post swallow for puree/solids. There was No Aspiration observed before, during or after the swallow. \par \par RESULTS: \par No Aspiration observed before, during or after the swallow. \par \par Of Note: An Esophageal Screen was performed. Patient was given a Barium Tablet with a cup of water. The Tablet was observed to course through the pharynx/esophagus without hold up. This cannot be considered as a full complete evaluation of the esophagus. \par \par

## 2021-12-05 DIAGNOSIS — Z01.818 ENCOUNTER FOR OTHER PREPROCEDURAL EXAMINATION: ICD-10-CM

## 2021-12-06 ENCOUNTER — APPOINTMENT (OUTPATIENT)
Dept: DISASTER EMERGENCY | Facility: CLINIC | Age: 66
End: 2021-12-06

## 2021-12-07 LAB — SARS-COV-2 N GENE NPH QL NAA+PROBE: NOT DETECTED

## 2021-12-09 ENCOUNTER — RESULT REVIEW (OUTPATIENT)
Age: 66
End: 2021-12-09

## 2021-12-09 ENCOUNTER — APPOINTMENT (OUTPATIENT)
Dept: GASTROENTEROLOGY | Facility: HOSPITAL | Age: 66
End: 2021-12-09

## 2021-12-09 ENCOUNTER — OUTPATIENT (OUTPATIENT)
Dept: OUTPATIENT SERVICES | Facility: HOSPITAL | Age: 66
LOS: 1 days | End: 2021-12-09
Payer: COMMERCIAL

## 2021-12-09 VITALS
HEART RATE: 69 BPM | HEIGHT: 63 IN | DIASTOLIC BLOOD PRESSURE: 50 MMHG | TEMPERATURE: 98 F | RESPIRATION RATE: 21 BRPM | SYSTOLIC BLOOD PRESSURE: 145 MMHG | OXYGEN SATURATION: 99 % | WEIGHT: 199.96 LBS

## 2021-12-09 VITALS — DIASTOLIC BLOOD PRESSURE: 61 MMHG | SYSTOLIC BLOOD PRESSURE: 124 MMHG | HEART RATE: 66 BPM

## 2021-12-09 DIAGNOSIS — Z86.010 PERSONAL HISTORY OF COLONIC POLYPS: ICD-10-CM

## 2021-12-09 DIAGNOSIS — K21.00 GASTRO-ESOPHAGEAL REFLUX DISEASE WITH ESOPHAGITIS, WITHOUT BLEEDING: ICD-10-CM

## 2021-12-09 DIAGNOSIS — Z96.652 PRESENCE OF LEFT ARTIFICIAL KNEE JOINT: Chronic | ICD-10-CM

## 2021-12-09 DIAGNOSIS — Z98.89 OTHER SPECIFIED POSTPROCEDURAL STATES: Chronic | ICD-10-CM

## 2021-12-09 DIAGNOSIS — K21.9 GASTRO-ESOPHAGEAL REFLUX DISEASE WITHOUT ESOPHAGITIS: ICD-10-CM

## 2021-12-09 PROCEDURE — 45385 COLONOSCOPY W/LESION REMOVAL: CPT

## 2021-12-09 PROCEDURE — 88305 TISSUE EXAM BY PATHOLOGIST: CPT | Mod: 26

## 2021-12-09 PROCEDURE — 88305 TISSUE EXAM BY PATHOLOGIST: CPT

## 2021-12-09 PROCEDURE — 45385 COLONOSCOPY W/LESION REMOVAL: CPT | Mod: GC

## 2021-12-09 PROCEDURE — 43239 EGD BIOPSY SINGLE/MULTIPLE: CPT

## 2021-12-09 PROCEDURE — 43239 EGD BIOPSY SINGLE/MULTIPLE: CPT | Mod: GC

## 2021-12-09 RX ORDER — FAMOTIDINE 40 MG/1
40 TABLET, FILM COATED ORAL
Qty: 90 | Refills: 3 | Status: ACTIVE | COMMUNITY
Start: 2021-12-09 | End: 1900-01-01

## 2021-12-09 RX ORDER — LOSARTAN POTASSIUM 100 MG/1
1 TABLET, FILM COATED ORAL
Qty: 0 | Refills: 0 | DISCHARGE

## 2021-12-09 RX ORDER — PANTOPRAZOLE SODIUM 20 MG/1
1 TABLET, DELAYED RELEASE ORAL
Qty: 0 | Refills: 0 | DISCHARGE

## 2021-12-09 RX ORDER — POTASSIUM CHLORIDE 20 MEQ
0 PACKET (EA) ORAL
Qty: 0 | Refills: 0 | DISCHARGE

## 2021-12-09 RX ORDER — SODIUM CHLORIDE 9 MG/ML
500 INJECTION INTRAMUSCULAR; INTRAVENOUS; SUBCUTANEOUS
Refills: 0 | Status: COMPLETED | OUTPATIENT
Start: 2021-12-09 | End: 2021-12-09

## 2021-12-09 RX ORDER — LOSARTAN/HYDROCHLOROTHIAZIDE 100MG-25MG
1 TABLET ORAL
Qty: 0 | Refills: 0 | DISCHARGE

## 2021-12-09 RX ORDER — CHOLECALCIFEROL (VITAMIN D3) 125 MCG
1 CAPSULE ORAL
Qty: 0 | Refills: 0 | DISCHARGE

## 2021-12-09 RX ADMIN — SODIUM CHLORIDE 30 MILLILITER(S): 9 INJECTION INTRAMUSCULAR; INTRAVENOUS; SUBCUTANEOUS at 08:40

## 2021-12-09 NOTE — ASU PATIENT PROFILE, ADULT - FALL HARM RISK - UNIVERSAL INTERVENTIONS
Bed in lowest position, wheels locked, appropriate side rails in place/Call bell, personal items and telephone in reach/Instruct patient to call for assistance before getting out of bed or chair/Non-slip footwear when patient is out of bed/[place] to call system/Physically safe environment - no spills, clutter or unnecessary equipment/Purposeful Proactive Rounding/Room/bathroom lighting operational, light cord in reach

## 2021-12-09 NOTE — ASU PATIENT PROFILE, ADULT - NSICDXPASTSURGICALHX_GEN_ALL_CORE_FT
PAST SURGICAL HISTORY:  H/O dilation and curettage 2 years ago    H/O total knee replacement, left 2015     (normal spontaneous vaginal delivery) x3

## 2021-12-09 NOTE — ASU PREOP CHECKLIST - HEIGHT IN CM
[facility]: [phone]     [facility] Medication Refill Request Information:  * Please contact your pharmacy regarding ANY request for medication refills.  ** [facility] Prescription Fax = [phone]  * Please allow 3 business days for routine medication refills.  * Please allow 5 business days for controlled substance medication refills.     [facility] Test Result notification information:  *You will be notified with in 7-10 days of your appointment day regarding the results of your test.  If you are on MyChart you will be notified as soon as the provider has reviewed the results and signed off on them.   160.02

## 2021-12-09 NOTE — PRE PROCEDURE NOTE - PRE PROCEDURE EVALUATION
Attending Physician:             Yaquelin Hernandez               Procedure: upper endoscopy, colonoscopy    Indication for Procedure: GERD. dysphagia, hx of adenomatous colon polyp  ________________________________________________________  PAST MEDICAL & SURGICAL HISTORY:  Hypothyroidism  18 ; pt denies    HTN (hypertension)    Abdominal pain  18 ; pt denies    Obesity (BMI 30-39.9)    Osteoarthrosis, localized, primary, involving lower leg    GERD (gastroesophageal reflux disease)    Neck pain    H/O dilation and curettage  2 years ago    H/O total knee replacement, left  2015     (normal spontaneous vaginal delivery)  x3      ALLERGIES:  adhesives (Rash)  aspirin (Unknown)  naproxen (Stomach Upset)  pt stated that pt has allegy with Aqua cell (Blisters)  pt states told &quot; secondary to glue used on her knee when she had the Left Knee replaced&quot; (Rash)  tetanus immune globulin (Rash)    HOME MEDICATIONS:  docusate sodium 100 mg oral capsule: 1 cap(s) orally 3 times a day  over the counter for constipation caused by pain meds   losartan-hydrochlorothiazide 50mg-12.5mg oral tablet: 1 tab(s) orally once a day  magnesium: 1 tab(s) irrigation in the morning and at bedtime  potassium chloride 20 mEq oral granule, extended release: orally once a day  Protonix 40 mg oral delayed release tablet: 1 tab(s) orally once a day  senna oral tablet: 2 tab(s) orally once a day (at bedtime)  over the counter for constipation caused by pain meds   Vitamin D3 5000 intl units oral capsule: 1 cap(s) orally once a day    AICD/PPM: [ ] yes   [ ] no    PERTINENT LAB DATA:                      PHYSICAL EXAMINATION:    T(C): --  HR: --  BP: --  RR: --  SpO2: --    Constitutional: NAD  HEENT: PERRLA, EOMI,    Neck:  No JVD  Respiratory: CTAB/L  Cardiovascular: S1 and S2  Gastrointestinal: BS+, soft, NT/ND  Extremities: No peripheral edema  Neurological: A/O x 3, no focal deficits  Psychiatric: Normal mood, normal affect  Skin: No rashes    ASA Class: I [ ]  II [ ]  III [ ]  IV [ ]    COMMENTS:    The patient is a suitable candidate for the planned procedure unless box checked [ ]  No, explain:

## 2021-12-09 NOTE — ASU PATIENT PROFILE, ADULT - NSICDXPASTMEDICALHX_GEN_ALL_CORE_FT
PAST MEDICAL HISTORY:  Abdominal pain 9/11/18 ; pt denies    GERD (gastroesophageal reflux disease)     HTN (hypertension)     Hypothyroidism 9/11/18 ; pt denies    Neck pain     Obesity (BMI 30-39.9)     Osteoarthrosis, localized, primary, involving lower leg

## 2021-12-13 LAB — SURGICAL PATHOLOGY STUDY: SIGNIFICANT CHANGE UP

## 2022-01-12 DIAGNOSIS — R13.10 DYSPHAGIA, UNSPECIFIED: ICD-10-CM

## 2022-03-28 ENCOUNTER — NON-APPOINTMENT (OUTPATIENT)
Age: 67
End: 2022-03-28

## 2022-05-10 ENCOUNTER — OUTPATIENT (OUTPATIENT)
Dept: OUTPATIENT SERVICES | Facility: HOSPITAL | Age: 67
LOS: 1 days | End: 2022-05-10
Payer: COMMERCIAL

## 2022-05-10 ENCOUNTER — APPOINTMENT (OUTPATIENT)
Dept: ULTRASOUND IMAGING | Facility: IMAGING CENTER | Age: 67
End: 2022-05-10
Payer: COMMERCIAL

## 2022-05-10 ENCOUNTER — APPOINTMENT (OUTPATIENT)
Dept: RADIOLOGY | Facility: IMAGING CENTER | Age: 67
End: 2022-05-10
Payer: COMMERCIAL

## 2022-05-10 ENCOUNTER — APPOINTMENT (OUTPATIENT)
Dept: MAMMOGRAPHY | Facility: IMAGING CENTER | Age: 67
End: 2022-05-10
Payer: COMMERCIAL

## 2022-05-10 DIAGNOSIS — Z00.8 ENCOUNTER FOR OTHER GENERAL EXAMINATION: ICD-10-CM

## 2022-05-10 DIAGNOSIS — Z98.89 OTHER SPECIFIED POSTPROCEDURAL STATES: Chronic | ICD-10-CM

## 2022-05-10 DIAGNOSIS — Z96.652 PRESENCE OF LEFT ARTIFICIAL KNEE JOINT: Chronic | ICD-10-CM

## 2022-05-10 PROCEDURE — 77080 DXA BONE DENSITY AXIAL: CPT | Mod: 26

## 2022-05-10 PROCEDURE — 77063 BREAST TOMOSYNTHESIS BI: CPT | Mod: 26

## 2022-05-10 PROCEDURE — 77080 DXA BONE DENSITY AXIAL: CPT

## 2022-05-10 PROCEDURE — 76641 ULTRASOUND BREAST COMPLETE: CPT | Mod: 26,50

## 2022-05-10 PROCEDURE — 77067 SCR MAMMO BI INCL CAD: CPT

## 2022-05-10 PROCEDURE — 76641 ULTRASOUND BREAST COMPLETE: CPT

## 2022-05-10 PROCEDURE — 77063 BREAST TOMOSYNTHESIS BI: CPT

## 2022-05-10 PROCEDURE — 77067 SCR MAMMO BI INCL CAD: CPT | Mod: 26

## 2022-06-08 NOTE — ASU PREOP CHECKLIST - NS PREOP CHK MONITOR ANESTHESIA CONSENT
done
Communicate Risk of Fall with Harm to all staff/Reinforce activity limits and safety measures with patient and family/Tailored Fall Risk Interventions/Visual Cue: Yellow wristband and red socks/Bed in lowest position, wheels locked, appropriate side rails in place/Call bell, personal items and telephone in reach/Instruct patient to call for assistance before getting out of bed or chair/Non-slip footwear when patient is out of bed/[place] to call system/Physically safe environment - no spills, clutter or unnecessary equipment/Purposeful Proactive Rounding/Room/bathroom lighting operational, light cord in reach

## 2022-09-21 DIAGNOSIS — U07.1 COVID-19: ICD-10-CM

## 2022-09-21 RX ORDER — NIRMATRELVIR AND RITONAVIR 300-100 MG
20 X 150 MG & KIT ORAL
Qty: 1 | Refills: 0 | Status: ACTIVE | COMMUNITY
Start: 2022-09-21 | End: 1900-01-01

## 2023-04-26 DIAGNOSIS — T30.0 BURN OF UNSPECIFIED BODY REGION, UNSPECIFIED DEGREE: ICD-10-CM

## 2023-04-26 RX ORDER — SILVER SULFADIAZINE 10 MG/G
1 CREAM TOPICAL TWICE DAILY
Qty: 1 | Refills: 2 | Status: ACTIVE | COMMUNITY
Start: 2023-04-26 | End: 1900-01-01

## 2023-05-11 ENCOUNTER — RX RENEWAL (OUTPATIENT)
Age: 68
End: 2023-05-11

## 2023-05-14 ENCOUNTER — NON-APPOINTMENT (OUTPATIENT)
Age: 68
End: 2023-05-14

## 2023-06-07 ENCOUNTER — RX RENEWAL (OUTPATIENT)
Age: 68
End: 2023-06-07

## 2023-06-07 RX ORDER — OMEPRAZOLE 20 MG/1
20 CAPSULE, DELAYED RELEASE ORAL TWICE DAILY
Qty: 60 | Refills: 0 | Status: ACTIVE | COMMUNITY
Start: 2022-03-28 | End: 1900-01-01

## 2023-08-30 NOTE — DISCHARGE NOTE ADULT - NS TRANSFER PATIENT BELONGINGS
Clothing Bilobed Transposition Flap Text: The defect edges were debeveled with a #15 scalpel blade.  Given the location of the defect and the proximity to free margins a bilobed transposition flap was deemed most appropriate.  Using a sterile surgical marker, an appropriate bilobe flap drawn around the defect.    The area thus outlined was incised deep to adipose tissue with a #15 scalpel blade.  The skin margins were undermined to an appropriate distance in all directions utilizing iris scissors.

## 2023-09-15 ASSESSMENT — KOOS JR
GOING UP OR DOWN STAIRS: MODERATE
TWISING OR PIVOTING ON KNEE: MODERATE
TWISING OR PIVOTING ON KNEE: MODERATE
STRAIGHTENING KNEE FULLY: MODERATE
HOW SEVERE IS YOUR KNEE STIFFNESS AFTER FIRST WAKING IN MORNING: SEVERE
GOING UP OR DOWN STAIRS: MODERATE
RISING FROM SITTING: MODERATE
RISING FROM SITTING: MODERATE
STANDING UPRIGHT: MODERATE
IMPORTED KOOS JR SCORE: 50.01
BENDING TO THE FLOOR TO PICK UP OBJECT: MODERATE
IMPORTED FORM: YES
IMPORTED FORM: YES
IMPORTED KOOS JR SCORE: 50.01
KOOS JR RAW SCORE: 15
KOOS JR RAW SCORE: 15
BENDING TO THE FLOOR TO PICK UP OBJECT: MODERATE
HOW SEVERE IS YOUR KNEE STIFFNESS AFTER FIRST WAKING IN MORNING: SEVERE
STRAIGHTENING KNEE FULLY: MODERATE
STANDING UPRIGHT: MODERATE

## 2023-10-31 NOTE — REVIEW OF SYSTEMS
[FreeTextEntry9] : Neck pain with bilateral upper extremity radiculopathy Patient instructed to take oxcarbazepine  with a sip of water on the morning of procedure.

## 2025-02-12 ENCOUNTER — APPOINTMENT (OUTPATIENT)
Dept: RADIOLOGY | Facility: IMAGING CENTER | Age: 70
End: 2025-02-12
Payer: COMMERCIAL

## 2025-02-12 ENCOUNTER — OUTPATIENT (OUTPATIENT)
Dept: OUTPATIENT SERVICES | Facility: HOSPITAL | Age: 70
LOS: 1 days | End: 2025-02-12
Payer: COMMERCIAL

## 2025-02-12 ENCOUNTER — APPOINTMENT (OUTPATIENT)
Dept: MAMMOGRAPHY | Facility: IMAGING CENTER | Age: 70
End: 2025-02-12
Payer: COMMERCIAL

## 2025-02-12 DIAGNOSIS — Z00.8 ENCOUNTER FOR OTHER GENERAL EXAMINATION: ICD-10-CM

## 2025-02-12 DIAGNOSIS — Z98.89 OTHER SPECIFIED POSTPROCEDURAL STATES: Chronic | ICD-10-CM

## 2025-02-12 DIAGNOSIS — Z96.652 PRESENCE OF LEFT ARTIFICIAL KNEE JOINT: Chronic | ICD-10-CM

## 2025-02-12 PROCEDURE — 77067 SCR MAMMO BI INCL CAD: CPT | Mod: 26

## 2025-02-12 PROCEDURE — 77063 BREAST TOMOSYNTHESIS BI: CPT

## 2025-02-12 PROCEDURE — 77063 BREAST TOMOSYNTHESIS BI: CPT | Mod: 26

## 2025-02-12 PROCEDURE — 77080 DXA BONE DENSITY AXIAL: CPT | Mod: 26

## 2025-02-12 PROCEDURE — 77067 SCR MAMMO BI INCL CAD: CPT

## 2025-02-12 PROCEDURE — 77080 DXA BONE DENSITY AXIAL: CPT
